# Patient Record
Sex: FEMALE | Employment: PART TIME | URBAN - METROPOLITAN AREA
[De-identification: names, ages, dates, MRNs, and addresses within clinical notes are randomized per-mention and may not be internally consistent; named-entity substitution may affect disease eponyms.]

---

## 2017-01-30 RX ORDER — LEVOTHYROXINE SODIUM 200 UG/1
TABLET ORAL
Qty: 90 TAB | Refills: 0 | Status: SHIPPED | OUTPATIENT
Start: 2017-01-30 | End: 2017-02-14 | Stop reason: DRUGHIGH

## 2017-02-10 ENCOUNTER — OFFICE VISIT (OUTPATIENT)
Dept: FAMILY MEDICINE CLINIC | Age: 47
End: 2017-02-10

## 2017-02-10 VITALS
SYSTOLIC BLOOD PRESSURE: 120 MMHG | BODY MASS INDEX: 35.84 KG/M2 | OXYGEN SATURATION: 98 % | RESPIRATION RATE: 14 BRPM | HEART RATE: 97 BPM | TEMPERATURE: 98.2 F | HEIGHT: 66 IN | WEIGHT: 223 LBS | DIASTOLIC BLOOD PRESSURE: 80 MMHG

## 2017-02-10 DIAGNOSIS — E03.4 HYPOTHYROIDISM DUE TO ACQUIRED ATROPHY OF THYROID: Primary | ICD-10-CM

## 2017-02-10 DIAGNOSIS — R53.82 CHRONIC FATIGUE: ICD-10-CM

## 2017-02-10 LAB
BILIRUB UR QL STRIP: NEGATIVE
GLUCOSE UR-MCNC: NEGATIVE MG/DL
KETONES P FAST UR STRIP-MCNC: NEGATIVE MG/DL
PH UR STRIP: 7 [PH] (ref 4.6–8)
PROT UR QL STRIP: NEGATIVE MG/DL
SP GR UR STRIP: 1.01 (ref 1–1.03)
UA UROBILINOGEN AMB POC: NORMAL (ref 0.2–1)
URINALYSIS CLARITY POC: CLEAR
URINALYSIS COLOR POC: YELLOW
URINE BLOOD POC: NEGATIVE
URINE LEUKOCYTES POC: NORMAL
URINE NITRITES POC: NEGATIVE

## 2017-02-10 NOTE — PROGRESS NOTES
Chief Complaint   Patient presents with    Dizziness     pt states \" can't turn head real quick, I'll get dizzy\"    Fatigue    Thyroid Problem    Arm Pain     left arm pain   Pain gets up to a \"4\"  \"pulsating pain\"     1. Have you been to the ER, urgent care clinic since your last visit? Hospitalized since your last visit? No    2. Have you seen or consulted any other health care providers outside of the [facility] since your last visit? Include any pap smears or colon screening.  No   Health Maintenance Due   Topic Date Due    PAP AKA CERVICAL CYTOLOGY  06/05/2016

## 2017-02-10 NOTE — MR AVS SNAPSHOT
Visit Information Date & Time Provider Department Dept. Phone Encounter #  
 2/10/2017 11:30 AM Wilma Baeza MD [facility] [phone] [id number] Upcoming Health Maintenance Date Due  
 PAP AKA CERVICAL CYTOLOGY 6/5/2016 DTaP/Tdap/Td series (2 - Td) 2/10/2027 Allergies as of 2/10/2017  Review Complete On: 2/10/2017 By: Wilma Baeza MD  
  
 Severity Noted Reaction Type Reactions Diflucan [Fluconazole]  10/19/2012    Angioedema Current Immunizations  Reviewed on 1/13/2014 No immunizations on file. Not reviewed this visit You Were Diagnosed With   
  
 Codes Comments Hypothyroidism due to acquired atrophy of thyroid    -  Primary ICD-10-CM: E03.4 ICD-9-CM: 244.8, 246.8 Chronic fatigue     ICD-10-CM: R53.82 
ICD-9-CM: 780.79 Vitals BP Pulse Temp Resp Height(growth percentile) Weight(growth percentile) 120/80 97 98.2 °F (36.8 °C) (Oral) 14 5' 5.5\" (1.664 m) 223 lb (101.2 kg) SpO2 BMI OB Status Smoking Status 98% 36.54 kg/m2 Having regular periods Never Smoker Vitals History BMI and BSA Data Body Mass Index Body Surface Area  
 36.54 kg/m 2 2.16 m 2 Preferred Pharmacy Pharmacy Name Phone [facility] [address] [place] [address] [address] [phone] Your Updated Medication List  
  
   
This list is accurate as of: 2/10/17  1:00 PM.  Always use your most recent med list.  
  
  
  
  
 CLARITIN 10 mg tablet Generic drug:  loratadine Take 10 mg by mouth daily. EPINEPHrine 0.3 mg/0.3 mL injection Commonly known as:  EPIPEN  
0.3 mL by IntraMUSCular route once as needed for 1 dose. furosemide 40 mg tablet Commonly known as:  LASIX TAKE 1 TABLET BY MOUTH EVERY DAY AS NEEDED FOR FLUID SYNTHROID 200 mcg tablet Generic drug:  levothyroxine TAKE 1 TABLET BY MOUTH EVERY DAY BEFORE BREAKFAST. DISCONTINUE SYNTHROID 300MCG PER MD  
  
  
  
  
We Performed the Following CBC WITH AUTOMATED DIFF [13548 CPT(R)] METABOLIC PANEL, COMPREHENSIVE [28383 CPT(R)]   
 T4,FREE(DIRECT) [id number] CPT(R)] TSH 3RD GENERATION [25209 CPT(R)] Introducing [facility] & [facility] SERVICES! Dear Jonathan Jha: 
Thank you for requesting a [employer] account. Our records indicate that you have previously registered for a [employer] account but its currently inactive. Please call our [employer] support line at [phone]. Additional Information If you have questions, please visit the Frequently Asked Questions section of the [employer] website at [email or web address] Remember, [employer] is NOT to be used for urgent needs. For medical emergencies, dial 911. Now available from your iPhone and Android! Please provide this summary of care documentation to your next provider. Your primary care clinician is listed as Armando Robles. If you have any questions after today's visit, please call [phone].
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
## 2017-02-11 LAB
ALBUMIN SERPL-MCNC: 4.6 G/DL (ref 3.5–5.5)
ALBUMIN/GLOB SERPL: 1.8 {RATIO} (ref 1.1–2.5)
ALP SERPL-CCNC: 113 IU/L (ref 39–117)
ALT SERPL-CCNC: 18 IU/L (ref 0–32)
AST SERPL-CCNC: 19 IU/L (ref 0–40)
BASOPHILS # BLD AUTO: 0 X10E3/UL (ref 0–0.2)
BASOPHILS NFR BLD AUTO: 1 %
BILIRUB SERPL-MCNC: 0.2 MG/DL (ref 0–1.2)
BUN SERPL-MCNC: 12 MG/DL (ref 6–24)
BUN/CREAT SERPL: 16 (ref 9–23)
CALCIUM SERPL-MCNC: 9.3 MG/DL (ref 8.7–10.2)
CHLORIDE SERPL-SCNC: 97 MMOL/L (ref 96–106)
CO2 SERPL-SCNC: 22 MMOL/L (ref 18–29)
CREAT SERPL-MCNC: 0.75 MG/DL (ref 0.57–1)
EOSINOPHIL # BLD AUTO: 0.1 X10E3/UL (ref 0–0.4)
EOSINOPHIL NFR BLD AUTO: 2 %
ERYTHROCYTE [DISTWIDTH] IN BLOOD BY AUTOMATED COUNT: 14.2 % (ref 12.3–15.4)
GLOBULIN SER CALC-MCNC: 2.6 G/DL (ref 1.5–4.5)
GLUCOSE SERPL-MCNC: 102 MG/DL (ref 65–99)
HCT VFR BLD AUTO: 45.9 % (ref 34–46.6)
HGB BLD-MCNC: 15.6 G/DL (ref 11.1–15.9)
IMM GRANULOCYTES # BLD: 0 X10E3/UL (ref 0–0.1)
IMM GRANULOCYTES NFR BLD: 0 %
LYMPHOCYTES # BLD AUTO: 2.7 X10E3/UL (ref 0.7–3.1)
LYMPHOCYTES NFR BLD AUTO: 42 %
MCH RBC QN AUTO: 29.4 PG (ref 26.6–33)
MCHC RBC AUTO-ENTMCNC: 34 G/DL (ref 31.5–35.7)
MCV RBC AUTO: 86 FL (ref 79–97)
MONOCYTES # BLD AUTO: 0.3 X10E3/UL (ref 0.1–0.9)
MONOCYTES NFR BLD AUTO: 5 %
NEUTROPHILS # BLD AUTO: 3.2 X10E3/UL (ref 1.4–7)
NEUTROPHILS NFR BLD AUTO: 50 %
PLATELET # BLD AUTO: 297 X10E3/UL (ref 150–379)
POTASSIUM SERPL-SCNC: 4 MMOL/L (ref 3.5–5.2)
PROT SERPL-MCNC: 7.2 G/DL (ref 6–8.5)
RBC # BLD AUTO: 5.31 X10E6/UL (ref 3.77–5.28)
SODIUM SERPL-SCNC: 140 MMOL/L (ref 134–144)
T4 FREE SERPL-MCNC: 2.46 NG/DL (ref 0.82–1.77)
TSH SERPL DL<=0.005 MIU/L-ACNC: <0.006 UIU/ML (ref 0.45–4.5)
WBC # BLD AUTO: 6.4 X10E3/UL (ref 3.4–10.8)

## 2017-03-27 ENCOUNTER — OFFICE VISIT (OUTPATIENT)
Dept: FAMILY MEDICINE CLINIC | Age: 47
End: 2017-03-27

## 2017-03-27 VITALS
BODY MASS INDEX: 36.16 KG/M2 | OXYGEN SATURATION: 96 % | HEIGHT: 66 IN | SYSTOLIC BLOOD PRESSURE: 110 MMHG | WEIGHT: 225 LBS | TEMPERATURE: 98.1 F | HEART RATE: 81 BPM | DIASTOLIC BLOOD PRESSURE: 69 MMHG | RESPIRATION RATE: 14 BRPM

## 2017-03-27 DIAGNOSIS — E03.4 HYPOTHYROIDISM DUE TO ACQUIRED ATROPHY OF THYROID: Primary | ICD-10-CM

## 2017-03-27 RX ORDER — LEVOTHYROXINE SODIUM 200 UG/1
TABLET ORAL
Refills: 0 | COMMUNITY
Start: 2017-01-31 | End: 2017-03-27 | Stop reason: DRUGHIGH

## 2017-03-27 NOTE — PROGRESS NOTES
HISTORY OF PRESENT ILLNESS  Zoraida Marin is a 40 y.o. female. HPI Comes in with daughter HANNAH. Needs thyroid rechecked. STill has some fatigue at times. Also has some insomnia at times. Also has irritability, hot flashes. No palpitations, sweating. Some hot flashes that last for 30-60 seconds. Has gained some weight. Followed for hot flashes by GYN. ROS    Physical Exam   Constitutional: She is oriented to person, place, and time. She appears well-developed and well-nourished. Neck: No thyromegaly present. Cardiovascular: Normal rate, regular rhythm and normal heart sounds. No murmur heard. Pulmonary/Chest: Effort normal and breath sounds normal. She has no wheezes. Abdominal: Soft. Bowel sounds are normal. She exhibits no distension. There is no tenderness. There is no rebound and no guarding. Musculoskeletal: Normal range of motion. She exhibits no edema. Lymphadenopathy:     She has no cervical adenopathy. Neurological: She is alert and oriented to person, place, and time. Nursing note and vitals reviewed. ASSESSMENT and PLAN  Orders Placed This Encounter    T4,FREE(DIRECT)    TSH 3RD GENERATION     Daren Vegas was seen today for dizziness and thyroid problem. Diagnoses and all orders for this visit:    Hypothyroidism due to acquired atrophy of thyroid  -     T4,FREE(DIRECT)  -     TSH 3RD GENERATION      Follow-up Disposition:  Return in about 4 months (around 7/27/2017).

## 2017-03-27 NOTE — MR AVS SNAPSHOT
Visit Information Date & Time Provider Department Dept. Phone Encounter #  
 3/27/2017  4:15 PM Cristofer Rodriguez MD [facility] [phone] [id number] Upcoming Health Maintenance Date Due  
 PAP AKA CERVICAL CYTOLOGY 6/5/2016 DTaP/Tdap/Td series (2 - Td) 2/10/2027 Allergies as of 3/27/2017  Review Complete On: 3/27/2017 By: Dominic Cee LPN Severity Noted Reaction Type Reactions Diflucan [Fluconazole]  10/19/2012    Angioedema Current Immunizations  Reviewed on 1/13/2014 No immunizations on file. Not reviewed this visit You Were Diagnosed With   
  
 Codes Comments Hypothyroidism due to acquired atrophy of thyroid    -  Primary ICD-10-CM: E03.4 ICD-9-CM: 244.8, 246.8 Vitals BP Pulse Temp Resp Height(growth percentile) Weight(growth percentile) 110/69 81 98.1 °F (36.7 °C) (Oral) 14 5' 5.5\" (1.664 m) 225 lb (102.1 kg) SpO2 BMI OB Status Smoking Status 96% 36.87 kg/m2 Having regular periods Never Smoker BMI and BSA Data Body Mass Index Body Surface Area  
 36.87 kg/m 2 2.17 m 2 Preferred Pharmacy Pharmacy Name Phone [facility][id number]- [place], [place] - [address] [phone] Your Updated Medication List  
  
   
This list is accurate as of: 3/27/17  5:04 PM.  Always use your most recent med list.  
  
  
  
  
 CLARITIN 10 mg tablet Generic drug:  loratadine Take 10 mg by mouth daily. EPINEPHrine 0.3 mg/0.3 mL injection Commonly known as:  EPIPEN  
0.3 mL by IntraMUSCular route once as needed for 1 dose. furosemide 40 mg tablet Commonly known as:  LASIX TAKE 1 TABLET BY MOUTH EVERY DAY AS NEEDED FOR FLUID  
  
 levothyroxine 150 mcg tablet Commonly known as:  SYNTHROID Take 1 Tab by mouth Daily (before breakfast). We Performed the Following T4,FREE(DIRECT) [id number] CPT(R)] TSH 3RD GENERATION [56100 CPT(R)] Introducing [facility] & [facility] SERVICES! Dear Rhina Smith: 
Thank you for requesting a [employer] account. Our records indicate that you have previously registered for a [employer] account but its currently inactive. Please call our [employer] support line at [phone]. Additional Information If you have questions, please visit the Frequently Asked Questions section of the [employer] website at [email or web address] Remember, [employer] is NOT to be used for urgent needs. For medical emergencies, dial 911. Now available from your iPhone and Android! Please provide this summary of care documentation to your next provider. Your primary care clinician is listed as Aminah Kwok. If you have any questions after today's visit, please call [phone].

## 2017-03-27 NOTE — PROGRESS NOTES
Chief Complaint   Patient presents with    Dizziness     f/u    Thyroid Problem     f/u     1. Have you been to the ER, urgent care clinic since your last visit? Hospitalized since your last visit? No    2. Have you seen or consulted any other health care providers outside of the [address] [place] [address] since your last visit? Include any pap smears or colon screening.  No     Health Maintenance Due   Topic Date Due    PAP AKA CERVICAL CYTOLOGY  06/05/2016

## 2017-03-28 LAB
T4 FREE SERPL-MCNC: 2.43 NG/DL (ref 0.82–1.77)
TSH SERPL DL<=0.005 MIU/L-ACNC: <0.006 UIU/ML (ref 0.45–4.5)

## 2017-05-04 RX ORDER — FUROSEMIDE 40 MG/1
TABLET ORAL
Qty: 30 TAB | Refills: 0 | Status: SHIPPED | OUTPATIENT
Start: 2017-05-04 | End: 2017-06-01 | Stop reason: SDUPTHER

## 2017-06-01 RX ORDER — FUROSEMIDE 40 MG/1
TABLET ORAL
Qty: 30 TAB | Refills: 0 | Status: SHIPPED | OUTPATIENT
Start: 2017-06-01 | End: 2017-06-29 | Stop reason: SDUPTHER

## 2017-06-29 RX ORDER — FUROSEMIDE 40 MG/1
TABLET ORAL
Qty: 30 TAB | Refills: 0 | Status: SHIPPED | OUTPATIENT
Start: 2017-06-29 | End: 2017-07-24 | Stop reason: SDUPTHER

## 2017-07-24 ENCOUNTER — OFFICE VISIT (OUTPATIENT)
Dept: FAMILY MEDICINE CLINIC | Age: 47
End: 2017-07-24

## 2017-07-24 VITALS
BODY MASS INDEX: 38.31 KG/M2 | WEIGHT: 238.4 LBS | DIASTOLIC BLOOD PRESSURE: 71 MMHG | SYSTOLIC BLOOD PRESSURE: 115 MMHG | HEART RATE: 84 BPM | RESPIRATION RATE: 18 BRPM | HEIGHT: 66 IN | TEMPERATURE: 96.9 F | OXYGEN SATURATION: 96 %

## 2017-07-24 DIAGNOSIS — E03.9 ACQUIRED HYPOTHYROIDISM: Primary | ICD-10-CM

## 2017-07-24 RX ORDER — FUROSEMIDE 40 MG/1
40 TABLET ORAL DAILY
Qty: 90 TAB | Status: SHIPPED | OUTPATIENT
Start: 2017-07-24 | End: 2018-10-30 | Stop reason: SDUPTHER

## 2017-07-24 NOTE — PROGRESS NOTES
HISTORY OF PRESENT ILLNESS  Jo-Ann Ramirez is a 56 y.o. female. HPI In for thyroid check. Has gone thru the menopause. Gets an occasional hot flash. Otherwise doing fairly well. Still doing hair. ROS    Physical Exam   Constitutional: She is oriented to person, place, and time. She appears well-developed and well-nourished. Neck: No thyromegaly present. Cardiovascular: Normal rate, regular rhythm and normal heart sounds. No murmur heard. Pulmonary/Chest: Effort normal and breath sounds normal. She has no wheezes. Abdominal: Soft. Bowel sounds are normal. She exhibits no distension. There is no tenderness. There is no rebound and no guarding. Musculoskeletal: Normal range of motion. She exhibits no edema. Lymphadenopathy:     She has no cervical adenopathy. Neurological: She is alert and oriented to person, place, and time. Nursing note and vitals reviewed. ASSESSMENT and PLAN  Orders Placed This Encounter    TSH 3RD GENERATION    T4,FREE(DIRECT)    furosemide (LASIX) 40 mg tablet     Ruby was seen today for thyroid problem. Diagnoses and all orders for this visit:    Acquired hypothyroidism  -     TSH 3RD GENERATION  -     T4,FREE(DIRECT)    Other orders  -     furosemide (LASIX) 40 mg tablet; Take 1 Tab by mouth daily.       Follow-up Disposition: Not on File

## 2017-07-24 NOTE — MR AVS SNAPSHOT
Visit Information Date & Time Provider Department Dept. Phone Encounter #  
 7/24/2017 10:00 AM Rae Joel MD [facility] [phone] [id number] Upcoming Health Maintenance Date Due  
 PAP AKA CERVICAL CYTOLOGY 6/5/2016 INFLUENZA AGE 9 TO ADULT 8/1/2017 DTaP/Tdap/Td series (2 - Td) 2/10/2027 Allergies as of 7/24/2017  Review Complete On: 7/24/2017 By: Onel Montaño LPN Severity Noted Reaction Type Reactions Diflucan [Fluconazole]  10/19/2012    Angioedema Current Immunizations  Reviewed on 1/13/2014 No immunizations on file. Not reviewed this visit You Were Diagnosed With   
  
 Codes Comments Acquired hypothyroidism    -  Primary ICD-10-CM: E03.9 ICD-9-CM: 663. 9 Vitals BP Pulse Temp Resp Height(growth percentile) Weight(growth percentile) 115/71 (BP 1 Location: Right arm, BP Patient Position: Sitting) 84 96.9 °F (36.1 °C) (Oral) 18 5' 5.5\" (1.664 m) 238 lb 6.4 oz (108.1 kg) LMP SpO2 BMI OB Status Smoking Status 05/14/2014 (Approximate) 96% 39.07 kg/m2 Postmenopausal Never Smoker Vitals History BMI and BSA Data Body Mass Index Body Surface Area 39.07 kg/m 2 2.24 m 2 Preferred Pharmacy Pharmacy Name Phone [id number] [address] [phone] Your Updated Medication List  
  
   
This list is accurate as of: 7/24/17 11:06 AM.  Always use your most recent med list.  
  
  
  
  
 CLARITIN 10 mg tablet Generic drug:  loratadine Take 10 mg by mouth daily. EPINEPHrine 0.3 mg/0.3 mL injection Commonly known as:  EPIPEN  
0.3 mL by IntraMUSCular route once as needed for 1 dose. furosemide 40 mg tablet Commonly known as:  LASIX Take 1 Tab by mouth daily. levothyroxine 125 mcg tablet Commonly known as:  SYNTHROID Take 1 Tab by mouth Daily (before breakfast). Prescriptions Sent to Pharmacy Refills  
 furosemide (LASIX) 40 mg tablet PRN Sig: Take 1 Tab by mouth daily. Class: Normal  
 Pharmacy: [facility]/pharmacy [address] [facility] [address]  #: [phone] Route: Oral  
  
We Performed the Following T4,FREE(DIRECT) [id number] CPT(R)] TSH 3RD GENERATION [65156 CPT(R)] Introducing [facility] & [facility] SERVICES! Dear Gabi Vargas: 
Thank you for requesting a [employer] account. Our records indicate that you have previously registered for a [employer] account but its currently inactive. Please call our [employer] support line at [phone]. Additional Information If you have questions, please visit the Frequently Asked Questions section of the [employer] website at [email or web address] Remember, [employer] is NOT to be used for urgent needs. For medical emergencies, dial 911. Now available from your iPhone and Android! Please provide this summary of care documentation to your next provider. Your primary care clinician is listed as Jorge Eubanks. If you have any questions after today's visit, please call [phone].

## 2017-07-24 NOTE — PROGRESS NOTES
Chief Complaint   Patient presents with    Thyroid Problem     follow up     Still complaining of being tired. C/O of left leg pain after being on it for awhile. GYN by Dr. Rita Monique, due this year.

## 2017-07-25 LAB
T4 FREE SERPL-MCNC: 1.35 NG/DL (ref 0.82–1.77)
TSH SERPL DL<=0.005 MIU/L-ACNC: 2.68 UIU/ML (ref 0.45–4.5)

## 2017-08-25 RX ORDER — FUROSEMIDE 40 MG/1
TABLET ORAL
Qty: 30 TAB | Refills: 0 | Status: SHIPPED | OUTPATIENT
Start: 2017-08-25 | End: 2020-01-30 | Stop reason: SDUPTHER

## 2017-08-29 RX ORDER — LEVOTHYROXINE SODIUM 125 UG/1
125 TABLET ORAL
Qty: 90 TAB | Refills: 2 | Status: SHIPPED | OUTPATIENT
Start: 2017-08-29 | End: 2017-09-01 | Stop reason: SDUPTHER

## 2017-09-02 RX ORDER — LEVOTHYROXINE SODIUM 125 UG/1
125 TABLET ORAL
Qty: 90 TAB | Refills: 2 | Status: SHIPPED | OUTPATIENT
Start: 2017-09-02 | End: 2021-06-29 | Stop reason: ALTCHOICE

## 2018-10-30 RX ORDER — FUROSEMIDE 40 MG/1
40 TABLET ORAL DAILY
Qty: 90 TAB | Refills: 0 | Status: SHIPPED | OUTPATIENT
Start: 2018-10-30 | End: 2021-03-29 | Stop reason: SDUPTHER

## 2018-10-30 NOTE — TELEPHONE ENCOUNTER
Last Visit: 7/24/17  Next Appt: none  Previous Refill Encounter: 7/24/17-90+998    Requested Prescriptions     Pending Prescriptions Disp Refills    furosemide (LASIX) 40 mg tablet 90 Tab 0     Sig: Take 1 Tab by mouth daily.  (needs appt for further refills)

## 2020-01-30 ENCOUNTER — OFFICE VISIT (OUTPATIENT)
Dept: INTERNAL MEDICINE CLINIC | Age: 50
End: 2020-01-30

## 2020-01-30 VITALS
RESPIRATION RATE: 16 BRPM | BODY MASS INDEX: 38.41 KG/M2 | OXYGEN SATURATION: 95 % | WEIGHT: 239 LBS | HEIGHT: 66 IN | SYSTOLIC BLOOD PRESSURE: 127 MMHG | HEART RATE: 81 BPM | DIASTOLIC BLOOD PRESSURE: 82 MMHG

## 2020-01-30 DIAGNOSIS — E66.01 SEVERE OBESITY (HCC): ICD-10-CM

## 2020-01-30 DIAGNOSIS — R41.3 MEMORY DEFICIT: ICD-10-CM

## 2020-01-30 DIAGNOSIS — M62.838 TRAPEZIUS MUSCLE SPASM: ICD-10-CM

## 2020-01-30 DIAGNOSIS — M54.2 NECK PAIN: Primary | ICD-10-CM

## 2020-01-30 DIAGNOSIS — F90.9 ATTENTION DEFICIT HYPERACTIVITY DISORDER (ADHD), UNSPECIFIED ADHD TYPE: ICD-10-CM

## 2020-01-30 RX ORDER — MELOXICAM 15 MG/1
TABLET ORAL
COMMUNITY
Start: 2019-12-04 | End: 2021-08-17
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
## 2020-01-31 ENCOUNTER — TELEPHONE (OUTPATIENT)
Dept: NEUROLOGY | Age: 50
End: 2020-01-31

## 2020-01-31 NOTE — TELEPHONE ENCOUNTER
----- Message from Patsy Gordillo sent at 1/31/2020  9:01 AM EST -----  Regarding: Dr. Marly Taylor  Pt would like a call back regarding scheduling a NP appt.  Contact is [phone]

## 2020-02-04 NOTE — TELEPHONE ENCOUNTER
----- Message from Tian [place] sent at 2/4/2020 10:16 AM EST -----  Regarding: YOLANDA/MD/TELEPHONE  Contact: [phone]  Caller's first and last name: Nicole Brown  Reason for call: N/A  Callback required yes/no and why: Yes  Best contact number(s): [phone]  Details to clarify the request: New patient requesting to schedule a new pt appt with Dr. Lisette Norman. 2nd attempt.

## 2020-02-11 ENCOUNTER — APPOINTMENT (OUTPATIENT)
Dept: PHYSICAL THERAPY | Age: 50
End: 2020-02-11

## 2020-02-19 ENCOUNTER — OFFICE VISIT (OUTPATIENT)
Dept: NEUROLOGY | Age: 50
End: 2020-02-19

## 2020-02-19 DIAGNOSIS — F90.9 ATTENTION DEFICIT HYPERACTIVITY DISORDER (ADHD), UNSPECIFIED ADHD TYPE: Primary | ICD-10-CM

## 2020-02-19 DIAGNOSIS — F33.1 MODERATE EPISODE OF RECURRENT MAJOR DEPRESSIVE DISORDER (HCC): ICD-10-CM

## 2020-02-19 DIAGNOSIS — F31.0 BIPOLAR AFFECTIVE DISORDER, CURRENT EPISODE HYPOMANIC (HCC): ICD-10-CM
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
## 2020-02-21 ENCOUNTER — HOSPITAL ENCOUNTER (OUTPATIENT)
Dept: PHYSICAL THERAPY | Age: 50
Discharge: HOME | End: 2020-02-21
Payer: MEDICAID

## 2020-02-21 PROCEDURE — 97161 PT EVAL LOW COMPLEX 20 MIN: CPT
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
## 2020-02-24 ENCOUNTER — APPOINTMENT (OUTPATIENT)
Dept: PHYSICAL THERAPY | Age: 50
End: 2020-02-24
Payer: MEDICAID

## 2020-02-28 ENCOUNTER — HOSPITAL ENCOUNTER (OUTPATIENT)
Dept: PHYSICAL THERAPY | Age: 50
Discharge: HOME | End: 2020-02-28
Payer: MEDICAID

## 2020-02-28 PROCEDURE — 97014 ELECTRIC STIMULATION THERAPY: CPT

## 2020-02-28 PROCEDURE — 97110 THERAPEUTIC EXERCISES: CPT

## 2020-02-28 PROCEDURE — 97140 MANUAL THERAPY 1/> REGIONS: CPT

## 2020-02-28 NOTE — PROGRESS NOTES
PT DAILY TREATMENT NOTE - [facility] 2-15    Patient Name: Han Ramires  Date:2020  : 1970  [x]  Patient  Verified  Payor: [place] / Plan: 1500 / Product Type: Medicaid /    In time:7:35 am  Out time:8:36 am  Total Treatment Time (min): 61  Total Timed Codes (min): 46  1:1 Treatment Time ( only): --   Visit #:  2    Treatment Area: Neck pain [M54.2]  Low back pain [M54.5]    SUBJECTIVE  Pain Level (0-10 scale): 5/10  Any medication changes, allergies to medications, adverse drug reactions, diagnosis change, or new procedure performed?: [x] No    [] Yes (see summary sheet for update)  Subjective functional status/changes:   [] No changes reported  Pt reports she has been working on her cervical retractions diligently which have felt good. Pt notes continued daily headaches, though decreased length of time. She did not have a headache the rest of the day or the day after evaluation. OBJECTIVE    Modality rationale: decrease pain and increase tissue extensibility to improve the patients ability to perform functional activities with decreased pain or discomfort.    Min Type Additional Details      15 [x] Estim: []Att   []Unatt    []TENS instruct                  []IFC  []Premod   []NMES                     []Other:  []w/US   []w/ice   []w/heat  Position:  Location:       []  Traction: [] Cervical       []Lumbar                       [] Prone          []Supine                       []Intermittent   []Continuous Lbs:  [] before manual  [] after manual  []w/heat    []  Ultrasound: []Continuous   [] Pulsed                       at: []1MHz   []3MHz Location:  W/cm2:    [] Paraffin         Location:   []w/heat    []  Ice     []  Heat  []  Ice massage Position:  Location:    []  Laser  []  Other: Position:  Location:      []  Vasopneumatic Device Pressure:       [] lo [] med [] hi   Temperature:      [x] Skin assessment post-treatment:  [x]intact []redness- no adverse reaction []redness - adverse reaction:     31 min Therapeutic Exercise:  [] See flow sheet :   Rationale: increase ROM and increase strength to improve the patients ability to perform ADLs with decreased pain or discomfort. 15 min Manual Therapy: SOR, MFR of cervical PS and upper trap musculature bilaterally in supine   Rationale: decrease pain, increase ROM, increase tissue extensibility and decrease trigger points to improve the patients ability to perform functional activities with decreased pain or discomfort. With   [] TE   [] TA   [] neuro   [] other: Patient Education: [x] Review HEP    [] Progressed/Changed HEP based on:   [] positioning   [] body mechanics   [] transfers   [] heat/ice application    [] other:      Other Objective/Functional Measures: --     Pain Level (0-10 scale) post treatment: 3/10    ASSESSMENT/Changes in Function:   Pt responded well to manual treatment today, noting tenderness within suboccipital musculature, though tolerable and decreased tension afterwards. Added various deep cervical and periscapular strengthening without complaints of increased neck or upper back pain. Introduced ab bracing against the wall for bilateral shoulder flexion with cuing to maintain contact of back against wall throughout motion, minimizing compensations. Patient will continue to benefit from skilled PT services to modify and progress therapeutic interventions, address functional mobility deficits, address ROM deficits, address strength deficits, analyze and address soft tissue restrictions, analyze and cue movement patterns, analyze and modify body mechanics/ergonomics and assess and modify postural abnormalities to attain remaining goals. [x]  See Plan of Care  []  See progress note/recertification  []  See Discharge Summary         Progress towards goals / Updated goals:  Short Term Goals: To be accomplished in 8 treatments:               1. Pt will be independent and compliant with HEP. 2. Pt will report and demonstrate improved sitting/standing posture. Long Term Goals: To be accomplished in 16 treatments:               1. Pt will be independent and compliant with HEP. 2. Pt will improve FOTO score by the MCID from 43 to 56 demonstrating improved overall function with decreased pain or discomfort. 3. Pt will report </= 2 headaches per week. 4. Pt will be able to tolerate work shifts without pain >3/10.               5. Pt will demonstrate >/= 60 degrees cervical rotation bilaterally without complaints of increased pain. 6. Pt will be able to drive without complaints of neck pain.     PLAN  [x]  Upgrade activities as tolerated     [x]  Continue plan of care  []  Update interventions per flow sheet       []  Discharge due to:_  []  Other:_      Carolann Schultz 2/28/2020

## 2020-03-04 ENCOUNTER — HOSPITAL ENCOUNTER (OUTPATIENT)
Dept: PHYSICAL THERAPY | Age: 50
Discharge: HOME | End: 2020-03-04
Payer: MEDICAID

## 2020-03-04 PROCEDURE — 97014 ELECTRIC STIMULATION THERAPY: CPT

## 2020-03-04 PROCEDURE — 97110 THERAPEUTIC EXERCISES: CPT

## 2020-03-04 PROCEDURE — 97140 MANUAL THERAPY 1/> REGIONS: CPT

## 2020-03-04 NOTE — PROGRESS NOTES
PT DAILY TREATMENT NOTE - [facility] 2-15    Patient Name: Golden Lay  Date:3/4/2020  : 1970  [x]  Patient  Verified  Payor: [place] / Plan: 1500  / Product Type: Medicaid /    In time: 2:02P  Out time: 3:20P  Total Treatment Time (min): 78  Total Timed Codes (min): 63  1:1 Treatment Time ( only): --   Visit #:  3    Treatment Area: Neck pain [M54.2]  Low back pain [M54.5]    SUBJECTIVE  Pain Level (0-10 scale): 5/10  Any medication changes, allergies to medications, adverse drug reactions, diagnosis change, or new procedure performed?: [x]? No    []? Yes (see summary sheet for update)  Subjective functional status/changes:   []? No changes reported  Pt reported feeling much better after last session.     OBJECTIVE            Modality rationale: decrease pain and increase tissue extensibility to improve the patients ability to perform functional activities with decreased pain or discomfort. Min Type Additional Details      15 [x]? Estim: []? Att   []? Unatt    []? TENS instruct                  []?IFC  []? Premod   []? NMES                     []?Other:  []?w/US   []?w/ice   []?w/heat  Position:  Location:        []? Traction: []? Cervical       []? Lumbar                       []? Prone          []? Supine                       []?Intermittent   []? Continuous Lbs:  []? before manual  []? after manual  []?w/heat     []? Ultrasound: []? Continuous   []? Pulsed                       at: []?1MHz   []? 3MHz Location:  W/cm2:     []? Paraffin         Location:   []?w/heat     []? Ice     []? Heat  []? Ice massage Position:  Location:     []? Laser  []? Other: Position:  Location:        []? Vasopneumatic Device Pressure:       []? lo []? med []? hi   Temperature:       [x]? Skin assessment post-treatment:  [x]? intact []? redness- no adverse reaction    []? redness - adverse reaction:      48 min Therapeutic Exercise:  [x]?  See flow sheet :   Rationale: increase ROM and increase strength to improve the patients ability to perform ADLs with decreased pain or discomfort.     15 min Manual Therapy: SOR, MFR of cervical PS and upper trap musculature bilaterally in supine   Rationale: decrease pain, increase ROM, increase tissue extensibility and decrease trigger points to improve the patients ability to perform functional activities with decreased pain or discomfort. With   []? TE   []? TA   []? neuro   []? other: Patient Education: [x]? Review HEP    []? Progressed/Changed HEP based on:   []? positioning   []? body mechanics   []? transfers   []? heat/ice application    []? other:       Other Objective/Functional Measures: --        Pain Level (0-10 scale) post treatment: 3/10     ASSESSMENT/Changes in Function:   Pt tolerated added strengthening and stabilization well today. Does require cues on core stabilization duringstanding scapular strengthening exercises. Patient will continue to benefit from skilled PT services to modify and progress therapeutic interventions, address functional mobility deficits, address ROM deficits, address strength deficits, analyze and address soft tissue restrictions, analyze and cue movement patterns, analyze and modify body mechanics/ergonomics and assess and modify postural abnormalities to attain remaining goals. [x]? See Plan of Care  []? See progress note/recertification  []? See Discharge Summary         Progress towards goals / Updated goals:  Short Term Goals: To be accomplished in 8 treatments:               1. Pt will be independent and compliant with HEP.              2. Pt will report and demonstrate improved sitting/standing posture. Long Term Goals: To be accomplished in 16 treatments:               1. Pt will be independent and compliant with HEP.                2. Pt will improve FOTO score by the MCID from 43 to 56 demonstrating improved overall function with decreased pain or discomfort.                3. Pt will report </= 2 headaches per week.               4. Pt will be able to tolerate work shifts without pain >3/10.               5. Pt will demonstrate >/= 60 degrees cervical rotation bilaterally without complaints of increased pain.               6. Pt will be able to drive without complaints of neck pain.     PLAN  [x]  Upgrade activities as tolerated     [x]  Continue plan of care  []  Update interventions per flow sheet       []  Discharge due to:_  []  Other:_      Zackary Gayle PTA, OPTA 3/4/2020

## 2020-03-06 ENCOUNTER — HOSPITAL ENCOUNTER (OUTPATIENT)
Dept: PHYSICAL THERAPY | Age: 50
Discharge: HOME | End: 2020-03-06
Payer: MEDICAID

## 2020-03-06 PROCEDURE — 97140 MANUAL THERAPY 1/> REGIONS: CPT

## 2020-03-06 PROCEDURE — 97110 THERAPEUTIC EXERCISES: CPT

## 2020-03-06 PROCEDURE — 97014 ELECTRIC STIMULATION THERAPY: CPT

## 2020-03-06 NOTE — PROGRESS NOTES
PT DAILY TREATMENT NOTE - [facility] 2-15    Patient Name: Golden Lay  Date:3/6/2020  : 1970  [x]  Patient  Verified  Payor: [place] / Plan: 1500 / Product Type: Medicaid /    In time: 10:35 am  Out time: 11:48am  Total Treatment Time (min): 73  Total Timed Codes (min): 58  1:1 Treatment Time ( only): --   Visit #:  4    Treatment Area: Neck pain [M54.2]  Low back pain [M54.5]    SUBJECTIVE  Pain Level (0-10 scale): 3/10  Any medication changes, allergies to medications, adverse drug reactions, diagnosis change, or new procedure performed?: [x]? No    []? Yes (see summary sheet for update)  Subjective functional status/changes:   []? No changes reported  Pt reports the exercises have been helping quite a bit and she did them this morning which has eased her pain. Her headaches are down to 1x every 2-3 days. Levator scap stretch and cervical retractions have been most helpful.     OBJECTIVE            Modality rationale: decrease pain and increase tissue extensibility to improve the patients ability to perform functional activities with decreased pain or discomfort. Min Type Additional Details      15 [x]? Estim: []? Att   []? Unatt    []? TENS instruct                  []?IFC  []? Premod   []? NMES                     []?Other:  []?w/US   []?w/ice   []?w/heat  Position:  Location:        []? Traction: []? Cervical       []? Lumbar                       []? Prone          []? Supine                       []?Intermittent   []? Continuous Lbs:  []? before manual  []? after manual  []?w/heat     []? Ultrasound: []? Continuous   []? Pulsed                       at: []?1MHz   []? 3MHz Location:  W/cm2:     []? Paraffin         Location:   []?w/heat     []? Ice     []? Heat  []? Ice massage Position:  Location:     []? Laser  []? Other: Position:  Location:        []? Vasopneumatic Device Pressure:       []? lo []? med []? hi   Temperature:       [x]?  Skin assessment post-treatment:  [x]? intact []? redness- no adverse reaction    []? redness - adverse reaction:      44 min Therapeutic Exercise:  [x]? See flow sheet :   Rationale: increase ROM and increase strength to improve the patients ability to perform ADLs with decreased pain or discomfort.     14 min Manual Therapy: SOR, MFR of cervical PS and upper trap musculature bilaterally in supine   Rationale: decrease pain, increase ROM, increase tissue extensibility and decrease trigger points to improve the patients ability to perform functional activities with decreased pain or discomfort. With   []? TE   []? TA   []? neuro   []? other: Patient Education: [x]? Review HEP    []? Progressed/Changed HEP based on:   []? positioning   []? body mechanics   []? transfers   []? heat/ice application    []? other:       Other Objective/Functional Measures: --        Pain Level (0-10 scale) post treatment: 3/10     ASSESSMENT/Changes in Function:   Responded well to manual treatment. Pt demonstrates continued limited and painful shoulder abduction AROM; provided shoulder abduction isometrics to promote improved activation/proprioception for increased AROM with decreased pain. Patient will continue to benefit from skilled PT services to modify and progress therapeutic interventions, address functional mobility deficits, address ROM deficits, address strength deficits, analyze and address soft tissue restrictions, analyze and cue movement patterns, analyze and modify body mechanics/ergonomics and assess and modify postural abnormalities to attain remaining goals. [x]? See Plan of Care  []? See progress note/recertification  []? See Discharge Summary         Progress towards goals / Updated goals:  Short Term Goals: To be accomplished in 8 treatments:               1. Pt will be independent and compliant with HEP.                2. Pt will report and demonstrate improved sitting/standing posture. Long Term Goals: To be accomplished in 16 treatments:               1. Pt will be independent and compliant with HEP.              2. Pt will improve FOTO score by the MCID from 43 to 56 demonstrating improved overall function with decreased pain or discomfort.                3. Pt will report </= 2 headaches per week.               4. Pt will be able to tolerate work shifts without pain >3/10.               5. Pt will demonstrate >/= 60 degrees cervical rotation bilaterally without complaints of increased pain.               6. Pt will be able to drive without complaints of neck pain.     PLAN  [x]  Upgrade activities as tolerated     [x]  Continue plan of care  []  Update interventions per flow sheet       []  Discharge due to:_  []  Other:_      Kamron Hall, PT, DPT 3/6/2020

## 2020-03-11 ENCOUNTER — APPOINTMENT (OUTPATIENT)
Dept: PHYSICAL THERAPY | Age: 50
End: 2020-03-11
Payer: MEDICAID

## 2020-03-12 ENCOUNTER — OFFICE VISIT (OUTPATIENT)
Dept: INTERNAL MEDICINE CLINIC | Age: 50
End: 2020-03-12

## 2020-03-12 VITALS
BODY MASS INDEX: 37.93 KG/M2 | HEIGHT: 66 IN | HEART RATE: 90 BPM | WEIGHT: 236 LBS | SYSTOLIC BLOOD PRESSURE: 124 MMHG | RESPIRATION RATE: 16 BRPM | TEMPERATURE: 98.5 F | OXYGEN SATURATION: 95 % | DIASTOLIC BLOOD PRESSURE: 82 MMHG

## 2020-03-12 DIAGNOSIS — Z12.4 CERVICAL CANCER SCREENING: ICD-10-CM

## 2020-03-12 DIAGNOSIS — E03.9 HYPOTHYROIDISM, UNSPECIFIED TYPE: ICD-10-CM

## 2020-03-12 DIAGNOSIS — M62.838 TRAPEZIUS MUSCLE SPASM: ICD-10-CM

## 2020-03-12 DIAGNOSIS — Z01.419 WELL WOMAN EXAM: Primary | ICD-10-CM

## 2020-03-12 DIAGNOSIS — Z12.39 BREAST CANCER SCREENING: ICD-10-CM

## 2020-03-12 DIAGNOSIS — E55.9 HYPOVITAMINOSIS D: ICD-10-CM

## 2020-03-12 NOTE — PROGRESS NOTES
Chief Complaint   Patient presents with    Neck Pain    Attention Deficit Disorder     she is a 51y.o. year old female who presents for follow up of injury. Follow Up Pain Assessment Encounter      Onset of Symptoms:  A couple months  ________________________________________________________________________  Description: Pain is now better       Pain Scale:(1-10): 4  Duration:  intermittent  Radiation: none  What makes it better?: OTC meds and rest  What makes it worse?: certain movements  Medications tried: acetaminophen, ibuprofen  Modalities tried: pt     Other: neuropsych follow up     Reviewed and agree with Nurse Note and duplicated in this note. Reviewed PmHx, RxHx, FmHx, SocHx, AllgHx and updated and dated in the chart.     Family History   Problem Relation Age of Onset    Heart Disease Maternal Grandmother     Heart Disease Maternal Grandfather     Stroke Maternal Grandfather     Cancer Paternal Grandfather        Past Medical History:   Diagnosis Date    Hypothyroidism     Musculoskeletal disorder 6/2011    numbness/pain to left arm    Thyroid disease       Social History     Socioeconomic History    Marital status: SINGLE     Spouse name: Not on file    Number of children: Not on file    Years of education: Not on file    Highest education level: Not on file   Tobacco Use    Smoking status: Never Smoker    Smokeless tobacco: Never Used   Substance and Sexual Activity    Alcohol use: Yes     Comment: occ    Drug use: No    Sexual activity: Not Currently   Social History Narrative    Single, 3 children, 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        Review of Systems - negative except as listed above      Objective:     Vitals:    03/12/20 1418   BP: 124/82   Pulse: 90   Resp: 16   Temp: 98.5 °F (36.9 °C)   TempSrc: Oral   SpO2: 95%   Weight: 236 lb (107 kg)   Height: 5' 5.5\" (1.664 m)       Physical Examination: General appearance - alert, well appearing, and in no distress  Eyes - pupils equal and reactive, extraocular eye movements intact  Ears - bilateral TM's and external ear canals normal  Nose - normal and patent, no erythema, discharge or polyps  Mouth - mucous membranes moist, pharynx normal without lesions  Neck - supple, no significant adenopathy  Chest - clear to auscultation, no wheezes, rales or rhonchi, symmetric air entry  Heart - normal rate, regular rhythm, normal S1, S2, no murmurs, rubs, clicks or gallops  Abdomen - soft, nontender, nondistended, no masses or organomegaly  Neurological - alert, oriented, normal speech, no focal findings or movement disorder noted  Musculoskeletal - no joint tenderness, deformity or swelling  Extremities - peripheral pulses normal, no pedal edema, no clubbing or cyanosis  Skin - normal coloration and turgor, no rashes, no suspicious skin lesions noted    Assessment/ Plan:   Diagnoses and all orders for this visit:    1. Well woman exam  -     LIPID PANEL  -     CBC W/O DIFF  -     METABOLIC PANEL, COMPREHENSIVE  -     TSH 3RD GENERATION  -     [facility] MAMMO BI SCREENING INCL CAD; Future    2. Trapezius muscle spasm    3. Hypothyroidism, unspecified type  -     TSH 3RD GENERATION    4. Hypovitaminosis D  -     VITAMIN D, 25 HYDROXY    5. Breast cancer screening  -     [facility] MAMMO BI SCREENING INCL CAD; Future    6. Cervical cancer screening  -     REFERRAL TO OBSTETRICS AND GYNECOLOGY           Pathophysiology, recovery and rehabilitation process discussed and questions answered   Counseling for 30 Minutes of the total visit duration   Pictures and figures used as necessary   Provided reassurance   Monitor response to Physical Therapy   Recommend activity modification   Recommend  lower impact activities-walking, Eliptical, Nordic Track, cycling or swimming   Follow up in 4 week(s)  Xray's reviewed - within normal limits        I have discussed the diagnosis with the patient and the intended plan as seen in the above orders.   The patient has received an after-visit summary and questions were answered concerning future plans. Medication Side Effects and Warnings were discussed with patient,  Patient Labs were reviewed and or requested, and  Patient Past Records were reviewed and or requested  yes     Pt agrees to call or return to clinic and/or go to closest ER with any worsening of symptoms. This may include, but not limited to increased fever (>100.4) with NSAIDS or Tylenol, increased edema, confusion, rash, worsening of presenting symptoms.

## 2020-03-13 ENCOUNTER — APPOINTMENT (OUTPATIENT)
Dept: PHYSICAL THERAPY | Age: 50
End: 2020-03-13
Payer: MEDICAID

## 2020-03-13 LAB
ALBUMIN SERPL-MCNC: 4.1 G/DL (ref 3.8–4.8)
ALBUMIN/GLOB SERPL: 1.7 {RATIO} (ref 1.2–2.2)
ALP SERPL-CCNC: 94 IU/L (ref 39–117)
ALT SERPL-CCNC: 29 IU/L (ref 0–32)
AST SERPL-CCNC: 27 IU/L (ref 0–40)
BILIRUB SERPL-MCNC: 0.2 MG/DL (ref 0–1.2)
BUN SERPL-MCNC: 11 MG/DL (ref 6–24)
BUN/CREAT SERPL: 11 (ref 9–23)
CALCIUM SERPL-MCNC: 9 MG/DL (ref 8.7–10.2)
CHLORIDE SERPL-SCNC: 105 MMOL/L (ref 96–106)
CHOLEST SERPL-MCNC: 237 MG/DL (ref 100–199)
CO2 SERPL-SCNC: 20 MMOL/L (ref 20–29)
CREAT SERPL-MCNC: 0.97 MG/DL (ref 0.57–1)
ERYTHROCYTE [DISTWIDTH] IN BLOOD BY AUTOMATED COUNT: 13.3 % (ref 11.7–15.4)
GLOBULIN SER CALC-MCNC: 2.4 G/DL (ref 1.5–4.5)
GLUCOSE SERPL-MCNC: 118 MG/DL (ref 65–99)
HCT VFR BLD AUTO: 41.9 % (ref 34–46.6)
HDLC SERPL-MCNC: 45 MG/DL
HGB BLD-MCNC: 14.1 G/DL (ref 11.1–15.9)
LDLC SERPL CALC-MCNC: 130 MG/DL (ref 0–99)
MCH RBC QN AUTO: 30.2 PG (ref 26.6–33)
MCHC RBC AUTO-ENTMCNC: 33.7 G/DL (ref 31.5–35.7)
MCV RBC AUTO: 90 FL (ref 79–97)
PLATELET # BLD AUTO: 281 X10E3/UL (ref 150–450)
POTASSIUM SERPL-SCNC: 4.1 MMOL/L (ref 3.5–5.2)
PROT SERPL-MCNC: 6.5 G/DL (ref 6–8.5)
RBC # BLD AUTO: 4.67 X10E6/UL (ref 3.77–5.28)
SODIUM SERPL-SCNC: 142 MMOL/L (ref 134–144)
TRIGL SERPL-MCNC: 312 MG/DL (ref 0–149)
TSH SERPL DL<=0.005 MIU/L-ACNC: 5.16 UIU/ML (ref 0.45–4.5)
VLDLC SERPL CALC-MCNC: 62 MG/DL (ref 5–40)
WBC # BLD AUTO: 6.2 X10E3/UL (ref 3.4–10.8)

## 2020-03-17 ENCOUNTER — APPOINTMENT (OUTPATIENT)
Dept: PHYSICAL THERAPY | Age: 50
End: 2020-03-17
Payer: MEDICAID

## 2020-03-20 NOTE — PROGRESS NOTES
[facility]   [address] [address], [address] [place] [address], [facility] Marc Cornejo   Phone: [phone] Fax: [phone]    Progress Note    Name: Augie Mccullough   : 1970   MD: Berny Kaufman MD       Treatment Diagnosis: Neck pain [M54.2]  Low back pain [M54.5]  Start of Care: 20    Visits from Start of Care: 5  Missed Visits: 1    Summary of Care: Pt therapy program has been put on hold temporarily due to COVID-19 precautions. Pt has been notified via telephone conversation and we will resume as needed once precautions are lifted and our clinic resumes normal operations. Thank you for your understanding and referral!     Short Term Goals: To be accomplished in 8 treatments:               1. Pt will be independent and compliant with HEP. - MET               2. Pt will report and demonstrate improved sitting/standing posture. - MET  Long Term Goals: To be accomplished in 16 treatments:               1. Pt will be independent and compliant with HEP. - MET               2. Pt will improve FOTO score by the MCID from 43 to 56 demonstrating improved overall function with decreased pain or discomfort. - unable to assess               3. Pt will report </= 2 headaches per week. - NOT MET               4. Pt will be able to tolerate work shifts without pain >3/10. - MET               5. Pt will demonstrate >/= 60 degrees cervical rotation bilaterally without complaints of increased pain. - unable to assess               6. Pt will be able to drive without complaints of neck pain. - MET       Juan Manuel Alvarez 3/20/2020     ________________________________________________________________________  NOTE TO PHYSICIAN:  Please complete the following and fax to: [facility]: Fax: [phone] . Ricardo Escalante Retain this original for your records. If you are unable to process this request in 24 hours, please contact our office.        ____ I have read the above report and request that my patient continue therapy with the following changes/special instructions:  ____ I have read the above report and request that my patient be discharged from therapy    Physician's Signature:_________________ Date:___________Time:__________

## 2020-03-24 ENCOUNTER — OFFICE VISIT (OUTPATIENT)
Dept: NEUROLOGY | Age: 50
End: 2020-03-24

## 2020-03-24 ENCOUNTER — APPOINTMENT (OUTPATIENT)
Dept: PHYSICAL THERAPY | Age: 50
End: 2020-03-24
Payer: MEDICAID

## 2020-03-24 DIAGNOSIS — F31.81 BIPOLAR 2 DISORDER, MAJOR DEPRESSIVE EPISODE (HCC): ICD-10-CM

## 2020-03-24 DIAGNOSIS — F90.2 ADHD (ATTENTION DEFICIT HYPERACTIVITY DISORDER), COMBINED TYPE: Primary | ICD-10-CM

## 2020-03-24 DIAGNOSIS — F33.1 MODERATE EPISODE OF RECURRENT MAJOR DEPRESSIVE DISORDER (HCC): ICD-10-CM

## 2020-03-24 NOTE — LETTER
3/29/20 Patient: Luz Maria Fischer YOB: 1970 Date of Visit: 3/24/2020 Shen Sanon MD 
[address] [id number] VIA In Basket Dear Shen Sanon MD, Thank you for referring Ms. Luz Maria Fischer to [address] for evaluation. My notes for this consultation are attached. This note will not be viewable in [address] [facility] at [facility] [address] [facility], Medical Office [address] Office:  [phone]  Fax: [phone] Neuropsychological Evaluation Report Patient Name: Luz Maria Fischer Age: 51 y.o. Gender: female Occupation: [employer] 
Handedness: right handed Presenting Concern: ADHD vs Mood Disorder Primary Care Physician: Catherine Waters MD 
Referring Provider: Catherine Waters MD 
 
PATIENT HISTORY (OBTAINED DURING INITIAL CLINICAL EVALUATION): 
 
REASON FOR REFERRAL: 
This comprehensive and medically necessary neuropsychological assessment was requested to assist with a differential diagnosis of ADHD. The use and purpose of this examination, as well as the extent and limitations of confidentiality, were explained prior to obtaining permission to participate.   Instructions were provided regarding the necessity to put forth optimal effort and answer questions truthfully in order to obtain reliable and accurate test results. 
  
PERTINENT HISTORY: 
Ms. Radha Collins presented for a neuropsychological assessment at the recommendation of her treating physician secondary to complaints of memory problems, starting and finishing projects in a timely manner, maintaining conversations, difficulty concentrating staying focused, jumping from topic to topic in conversation, difficulty initiating and/or maintaining her sleep, increased anxiety and worry inability to relax, feeling slowed down, increased irritability and arguing, increased verbal aggressiveness towards others, feeling more easily frustrated, and less efficient with technology. She reports that she had no difficulty with making developmental milestones but had difficulty achieving in school. Her parents  in third grade and she moved several times with her mother and 3 brothers. Her mom was a single mom from about the age of 10 or 7. Her high school years were characterized as being somewhat difficult with her demonstrating significant temper issues, and oppositional behavior, low self-esteem and her dropping out in the 11th grade. From a brief review of her medical and personal history there has not been any other significant neurological injury or illness noted or reported. She describes herself as being somewhat of a wild child, often at odds with her mother through adolescence. She also reports numerous speeding tickets in the past.  She is currently a mother of 3 children and a single mother. 
  
Ms. Selma Rodriguez does not  exercise on a regular basis and does not  maintain a balanced diet. She does  report problems with sleep and does  complain of pain. She does  participate in mentally stimulating activities. Ms. Selma Rodriguez does  have concerns but did not describe her stressors. She is currently working as a [employer]. Ms. Selma Rodriguez indicated that she is independent in her instrumental activities of daily living, including shopping, meal preparation, housekeeping, doing laundry, driving a car, managing medications, and finances. 
  
 
METHODS OF ASSESSMENT (Current Evaluation): 
Clinician Administered: 
Clinical Interview Review of Medical Records Clock Drawing Task Megha Adult ADHD Rating Scale Modified Mini-Mental Status Exam (3MS) Test of Premorbid Functioning Personality Assessment Inventory (MORRIS) Mood Disorders Questionnaire (MDQ) Dumont Depression Inventory (BDI-2) Dumont Anxiety Inventory (EMANI) Revised Memory and Behavior Checklist 
 
Technician Administered: 
Davis's Continuous Performance Test -3 Neuropsychological Assessment Battery: Attention Module, Memory Module, Executive Module RBANS-A: Line Orientation, Figure Copy, Semantic Fluency, Picture Naming Structured Inventory of Malingering Symptoms Test of Memory Malingering Test of Practical Judgment (9-Item) Test of Premorbid Functioning [place] Functional Living Scale Trail Making Test 
      [employer] Test 
 
TEST OBSERVATIONS: 
Ms. Elle Kohli arrived promptly for the testing session. Dress and grooming were appropriate; physical presentation was unchanged from that observed during the clinical interview. Speech was fluent, intelligible, and goal-directed. Affect was congruent with the euthymic mood conveyed. Ms. Elle Kohli was adequately cooperative and appeared to put forth good effort throughout this examination. Rapport with the examiner was adequately established and maintained. Minimal prompting was required. Comprehension of test instructions was not problematic. Performance motivation was objectively measured by three instruments (Reliable Digits, SAMSON, TOMM), and Ms. Roberts produced a normal score on these measures. Accordingly, test findings below do not appear to be the product of disingenuous effort. Given the above observations, plus comments contained in the Mental Status section, the results of this examination are regarded as reasonably reliable and valid. TEST RESULTS: 
Quantitative test results are derived from comparisons to age and education corrected cohort normative data, where applicable. Percentiles are included in these instances. Qualitative test results are determined using clinical observations. General Orientation and Awareness:      
Orientation to person, year, month, day of month, day of week, state, town, and circumstance. Awareness of deficits: Aware Cognitive performance validity testing: Valid Attention/Concentration:      Classification:   
Simple visuomotor tracking (7 percentile)               Mildly Impaired Digits forward (3 percentile)                 Moderately Impaired Digits backward (58 percentile)                 Average Visual scanning (38 percentile)                            Average Simple information processing  efficiency (14 percentile)  Low Average Complex information processing efficiency (1 percentile)  Severely Impaired Attention to visual detail of driving scenes (24 percentile)             Low Average Megha Continuous Performance Test - 3 Variable Type Measure T-Score Percentile Guideline Detectability 
  
d'  
70  
98 Very Elevated Error Type Omissions 79  
>99 Very Elevated Commissions 64  
92 Elevated Perseverative Commissions 73  
99 Very Elevated Reaction Time HRT 
  
48  
42 Average HRT SD 
  
81  
>99 Very Elevated HRT Block Change 39  
14 Low Visuospatial and Constructional Praxis:    
Figure copy (68 percentile)                                      Average Line orientation (70 percentile)                                                 Average Language:        
Picture naming (70 percentile)                               Average Semantic fluency (27 percentile)                    Average Memory and Learning:      
Word list immediate recall (96 percentile)               Superior Word list short delayed recall (96 percentile)               Superior Word list long delayed recall (97 percentile)                          Superior Shape learning immediate recognition (96 percentile)   Superior Shape learning delayed recognition (93 percentile)              Superior Story learning immediate recall (73 percentile)     Average Story learning delayed recall (66 percentile)                          Average Daily living memory immediate recall (54 percentile)    Average Daily living memory delayed recall (86 percentile)              High Average Cognitive Tests of Executive Functioning:    
Ability to think flexibly, Trail Making B (18 percentile)               Low Average Mazes (16 percentile)                   Low Average Simple judgment in daily decision making (62 percentile)              Average Complex judgment in daily decision making (33 percentile)               Average Categories (82 percentile)                  High Average Word generation (92 percentile)                   Superior WCST Total Errors (58 percentile)       Average Perseverative Responses (77 percentile)     High Average Categories Completed (>16 percentile) Adaptive Behavioral Measure of Daily Functioning: 
 Time:             >75 %ile Money/calculations:   75 %ile Communication:    50 %ile Memory:     25%ile Total:     T= 47 (38%ile) Intellectual and Basic Cognitive Functioning (WAIS-IV): 
ACS Test of pre-morbid functioning reading recognition lower limit estimated WAIS-IV FSIQ score: 
     Estimated full scale IQ:            91     27 percentile    Average Emotional and Behavioral Functioning: 
  
 CAARS:  Ms. Cindy Linton was administered the Brigette Adult ADHD Rating Scales immediately before a face-to-face interview with the evaluator. Ms. Cindy Linton consistency index indicated a possibly invalid response pattern, so the reliability of her responses is concerning. Ms. Cindy Linton met criteria for the DSM-IV category of ADHD with significant difficulties that included elevated activity level, low self-esteem, difficulty keeping track of several things, remembering things, staying focused when working, restlessness, speaking impulsively, distractibility, and difficulty staying organized.   On the CAARS she produced 6 scales that were significantly elevated (out of 8) consistent with a mixed inattentive-impulsive type of ADHD. These symptoms are primarily experienced across environmental settings. Her subjective reports, while an important aspect of the diagnosis, seem highly consistent with her objective findings. EMANI:  21 Moderate Anxiety BDI-2:  19 Moderate Depression MDQ:  11/13 Criteria Met For Bipolar Disorder MORRIS: Ms. Gustabo Hollis was administered the MORRIS to ascertain her level of emotional functioning at the time of the evaluation. Her validity indicators were examined noted to fall outside the normal range, suggesting that she may not have answered in a completely forthright manner; the nature of her responses might lead to a somewhat inaccurate impression based on her style of responding. With respect to positive image management, there is no evidence to suggest that she was generally motivated to protray herself as being relatively or minor faults. With respect to negative impression management there is no evidence to suggest that she was motivated to protray herself as being more negative than her clinical picture would warrant. Her clinical profile indicates a number of difficulties consistent with a significant depressive experience. Quality of her depression seems primarily marked by physiological features, such as a disturbance in sleep pattern, decreased energy, poor appetite and/or loss of sexual interest.  There is also evidence of a decrease loss thought process marked by confusion, distractibility and difficulty concentrating. She may also have difficulty communicating clearly her thoughts and ideas with other people because of initial or circumstantial thoughts. Ms. Gustabo Hollis suggests that she is easily insulted or slighted and tends to respond by holding grudges towards others.  She is likely to attribute her own misfortunes to the neglect of others and to discredit the successes of others as being the result of luck or favoritism. Diagnostic considerations would include Major Depressive Disorder or Bipolar I I Disorder. IMPRESSIONS: 
Ms. Missy Hopson was seen for a comprehensive neuropsychological evaluation and administered a battery of measures that assessed her attention, visual-spatial, language, memory, and executive functioning. Her overall level of performance across all 5 domains yielded a total score that was in the average range. Individual domain scores were all in the average range with the exception of her attention. Within the attention domain her performance on individual measures indicated a decline in processing of information efficiency. Her performance on the CPT3, indicated a total of 7 atypical scores which is associated with a very high likelihood of having a disorder characterized by attention deficits, such as ADHD. Her profile of scores in response pattern indicates that she is likely to have did not deficits in inattentiveness, impulsivity, sustained attention, and vigilance. These findings are consistent with her report on the CAARS that is consistent with her having symptoms of ADHD. On other domains there was no indication impairment indicated. Assessment of her emotional functioning suggest that she is also experiencing a depressive disorder or Bipolar II Disorder that is further exacerbating her condition. It is felt that these are likely concomitant conditions, rather than one ruling out the other. DIAGNOSTIC IMPRESSIONS: 
  ICD-10-CM ICD-9-CM 1. ADHD (attention deficit hyperactivity disorder), combined type F90.2 314.01   
2. Moderate episode of recurrent major depressive disorder ([facility]) F33.1 296.32   
3. Bipolar 2 disorder, major depressive [facility] F31.81 296.89 RECOMMENDATIONS:  
1. Findings should be reviewed with Ms. Missy Hopson to insure her understanding and discuss the potential implications. 2. Emphasis should be placed on Ms. Roberts obtaining good sleep hygiene and maintaining adequate physical exercise to promote good brain health. 3. Ms. Cindy Linton may benefit from a referral to psychotherapy to address anxiety and depression. 4. Ms. Cindy Linton should be considered for a psychiatric referral for medication for bipolar disorder and ADHD. 5. Ms. Cindy Linton is encouraged to seek out various forms of mental stimulation that would help to \"exercise\" her brain. This might include learning a new skill, hobby, travel, attending lectures, or evening reading or listening to podcasts or videos on topics of her interest. 
 
 
Thank you for allowing me the opportunity to assist you in Ms. Roberts's care. Please do not hesitate to contact me should you have additional questions that I may not have addressed. 96136 x 1 
96138 x 1 
96139 x 6 
96132 x 1 
96133 x 2 Pepito Cheney, Ph.D., ABPP Licensed Clinical Psychologist 
Neuropsychologist 
Board Certified Rehabilitation Psychologist 
 
 
Time Documentation: 
  
76494 x 1: Neurobehavioral Status Exam/Clinical Interview: (1 hour, (already billed on first date of service) 53738*4 Neuropsych testing/data gathering by Neuropsychologist (35 additional minutes, see above) 08861 x 1 
96139 x 6 Test Administration/Data Gathering By Technician: (3.5 hours). 33968 x 1 (first 30 minutes), 96139 x 7 (each additional 30 minutes) 23899 x 1 
96133 x 1 Testing Evaluation Services by Neuropsychologist (1 hour, 50 minutes) 96132 x 1 (first hour), 96133 x 1 (50 minutes) Definitions:   
  
44629/65670:  Neurobehavioral Status Exam, Clinical interview. Clinical assessment of thinking, reasoning and judgment, by neuropsychologist, both face to face time with patient and time interpreting those test results and reporting, first and subsequent hours) 43930/46812: Neuropsychological Test Administration by Technician/Psychometrist, first 30 minutes and each additional 30 minutes. The above includes: Record review. Review of history provided by patient. Review of collaborative information. Testing by Clinician. Review of raw data. Scoring. Report writing of individual tests administered by Clinician. Integration of individual tests administered by psychometrist with NSE/testing by clinician, review of records/history/collaborative information, case Conceptualization, treatment planning, clinical decision making, report writing, coordination Of Care. Psychometry test codes as time spent by psychometrist administering and scoring neurocognitive/psychological tests under supervision of neuropsychologist.  Integral services including scoring of raw data, data interpretation, case conceptualization, report writing etcetera were initiated after the patient finished testing/raw data collected and was completed on the date the report was signed. This note will not be viewable in [address] If you have questions, please do not hesitate to call me. I look forward to following your patient along with you.  
 
 
Sincerely, 
 
Krissy Solis, PHD

## 2020-03-25 ENCOUNTER — TELEPHONE (OUTPATIENT)
Dept: PHYSICAL THERAPY | Age: 50
End: 2020-03-25

## 2020-03-26 NOTE — PROGRESS NOTES
This note will not be viewable in [address] [facility] at [place] [address] [address] [place] [address]    Office:  [phone]  Fax: [phone]                                         Neuropsychological Evaluation Report    Patient Name: Han Ramires  Age: 51 y.o. Gender: female   Occupation: [employer]  Handedness: right handed   Presenting Concern: ADHD vs Mood Disorder  Primary Care Physician: Mac Toledo MD  Referring Provider: Mac Toledo MD    PATIENT HISTORY (OBTAINED DURING INITIAL CLINICAL EVALUATION):    REASON FOR REFERRAL:  This comprehensive and medically necessary neuropsychological assessment was requested to assist with a differential diagnosis of ADHD. The use and purpose of this examination, as well as the extent and limitations of confidentiality, were explained prior to obtaining permission to participate. Instructions were provided regarding the necessity to put forth optimal effort and answer questions truthfully in order to obtain reliable and accurate test results.     PERTINENT HISTORY:  Ms. Estefania Wellington presented for a neuropsychological assessment at the recommendation of her treating physician secondary to complaints of memory problems, starting and finishing projects in a timely manner, maintaining conversations, difficulty concentrating staying focused, jumping from topic to topic in conversation, difficulty initiating and/or maintaining her sleep, increased anxiety and worry inability to relax, feeling slowed down, increased irritability and arguing, increased verbal aggressiveness towards others, feeling more easily frustrated, and less efficient with technology. She reports that she had no difficulty with making developmental milestones but had difficulty achieving in school. Her parents  in third grade and she moved several times with her mother and 3 brothers.   Her mom was a single mom from about the age of 10 or 7. Her high school years were characterized as being somewhat difficult with her demonstrating significant temper issues, and oppositional behavior, low self-esteem and her dropping out in the 11th grade. From a brief review of her medical and personal history there has not been any other significant neurological injury or illness noted or reported. She describes herself as being somewhat of a wild child, often at odds with her mother through adolescence. She also reports numerous speeding tickets in the past.  She is currently a mother of 3 children and a single mother.     Ms. Lavon Robles does not  exercise on a regular basis and does not  maintain a balanced diet. She does  report problems with sleep and does  complain of pain. She does  participate in mentally stimulating activities. Ms. Lavon Robles does  have concerns but did not describe her stressors.  She is currently working as a [employer]. Ms. Lavon Robles indicated that she is independent in her instrumental activities of daily living, including shopping, meal preparation, housekeeping, doing laundry, driving a car, managing medications, and finances.       METHODS OF ASSESSMENT (Current Evaluation):  Clinician Administered:  Clinical Interview  Review of Medical Records  Clock Drawing Task  Megha Adult ADHD Rating Scale  Modified Mini-Mental Status Exam (3MS)  Test of Premorbid Functioning  Personality Assessment Inventory (MORRIS)  Mood Disorders Questionnaire (MDQ)  Dumont Depression Inventory (BDI-2)  Dumont Anxiety Inventory (EMANI)  Revised Memory and Behavior Checklist    Technician Administered:  Davis's Continuous Performance Test -3  Neuropsychological Assessment Battery: Attention Module, Memory Module, Executive Module  RBANS-A: Line Orientation, Figure Copy, Semantic Fluency, Picture Naming  Structured Inventory of Malingering Symptoms  Test of Memory Malingering  Test of Practical Judgment (9-Item)        Test of Premorbid Functioning  [place] Functional Living Scale  Trail Making Test        [facility] Card Sorting Test    TEST OBSERVATIONS:  Ms. Nancy Haque arrived promptly for the testing session. Dress and grooming were appropriate; physical presentation was unchanged from that observed during the clinical interview. Speech was fluent, intelligible, and goal-directed. Affect was congruent with the euthymic mood conveyed. Ms. Nancy Haque was adequately cooperative and appeared to put forth good effort throughout this examination. Rapport with the examiner was adequately established and maintained. Minimal prompting was required. Comprehension of test instructions was not problematic. Performance motivation was objectively measured by three instruments (Reliable Digits, SAMSON, TOMM), and Ms. Roberts produced a normal score on these measures. Accordingly, test findings below do not appear to be the product of disingenuous effort. Given the above observations, plus comments contained in the Mental Status section, the results of this examination are regarded as reasonably reliable and valid. TEST RESULTS:  Quantitative test results are derived from comparisons to age and education corrected cohort normative data, where applicable. Percentiles are included in these instances. Qualitative test results are determined using clinical observations. General Orientation and Awareness:       Orientation to person, year, month, day of month, day of week, state, town, and circumstance.    Awareness of deficits: Aware                Cognitive performance validity testing: Valid        Attention/Concentration:      Classification:    Simple visuomotor tracking (7 percentile)               Mildly Impaired  Digits forward (3 percentile)                 Moderately Impaired  Digits backward (58 percentile)                 Average  Visual scanning (38 percentile)                            Average  Simple information processing  efficiency (14 percentile)  Low Average  Complex information processing efficiency (1 percentile)  Severely Impaired  Attention to visual detail of driving scenes (24 percentile)             Low Average    Megha Continuous Performance Test - 3    Variable Type Measure T-Score Percentile Guideline     Detectability     d'   70   98   Very Elevated     Error Type     Omissions     79   >99   Very Elevated      Commissions     64   92   Elevated    Perseverative  Commissions   73   99   Very Elevated     Reaction Time     HRT     48   42   Average        HRT SD     81   >99   Very Elevated      HRT Block  Change   39   14   Low         Visuospatial and Constructional Praxis:     Figure copy (68 percentile)                                      Average  Line orientation (70 percentile)                                                 Average       Language:         Picture naming (70 percentile)                               Average  Semantic fluency (27 percentile)                    Average      Memory and Learning:       Word list immediate recall (96 percentile)               Superior  Word list short delayed recall (96 percentile)               Superior  Word list long delayed recall (97 percentile)                          Superior  Shape learning immediate recognition (96 percentile)   Superior   Shape learning delayed recognition (93 percentile)              Superior  Story learning immediate recall (73 percentile)     Average  Story learning delayed recall (66 percentile)                          Average  Daily living memory immediate recall (54 percentile)    Average  Daily living memory delayed recall (86 percentile)              High Average    Cognitive Tests of Executive Functioning:     Ability to think flexibly, Trail Making B (18 percentile)               Low Average  Mazes (16 percentile)                   Low Average      Simple judgment in daily decision making (62 percentile)              Average  Complex judgment in daily decision making (33 percentile)               Average  Categories (82 percentile)                  High Average  Word generation (92 percentile)                   Superior  WCST        Total Errors (58 percentile)       Average        Perseverative Responses (77 percentile)     High Average        Categories Completed (>16 percentile)    Adaptive Behavioral Measure of Daily Functioning:   Time:             >75 %ile   Money/calculations:   75 %ile  Communication:    50 %ile   Memory:     25%ile    Total:     T= 47 (38%ile)      Intellectual and Basic Cognitive Functioning (WAIS-IV):  ACS Test of pre-morbid functioning reading recognition lower limit estimated WAIS-IV FSIQ score:       Estimated full scale IQ:            91     27 percentile    Average    Emotional and Behavioral Functioning:      CAARS:  Ms. Clari Reid was administered the Brigette Adult ADHD Rating Scales immediately before a face-to-face interview with the evaluator. Ms. Clari Reid consistency index indicated a possibly invalid response pattern, so the reliability of her responses is concerning. Ms. Clari Reid met criteria for the DSM-IV category of ADHD with significant difficulties that included elevated activity level, low self-esteem, difficulty keeping track of several things, remembering things, staying focused when working, restlessness, speaking impulsively, distractibility, and difficulty staying organized. On the CAARS she produced 6 scales that were significantly elevated (out of 8) consistent with a mixed inattentive-impulsive type of ADHD. These symptoms are primarily experienced across environmental settings. Her subjective reports, while an important aspect of the diagnosis, seem highly consistent with her objective findings.        EMANI:  21 Moderate Anxiety   BDI-2:  19 Moderate Depression   MDQ:  11/13 Criteria Met For Bipolar Disorder     MORRIS: Ms. Clari Reid was administered the MORRIS to ascertain her level of emotional functioning at the time of the evaluation. Her validity indicators were examined noted to fall outside the normal range, suggesting that she may not have answered in a completely forthright manner; the nature of her responses might lead to a somewhat inaccurate impression based on her style of responding. With respect to positive image management, there is no evidence to suggest that she was generally motivated to protray herself as being relatively or minor faults. With respect to negative impression management there is no evidence to suggest that she was motivated to protray herself as being more negative than her clinical picture would warrant. Her clinical profile indicates a number of difficulties consistent with a significant depressive experience. Quality of her depression seems primarily marked by physiological features, such as a disturbance in sleep pattern, decreased energy, poor appetite and/or loss of sexual interest.  There is also evidence of a decrease loss thought process marked by confusion, distractibility and difficulty concentrating. She may also have difficulty communicating clearly her thoughts and ideas with other people because of initial or circumstantial thoughts. Ms. Daryl Hidalgo suggests that she is easily insulted or slighted and tends to respond by holding grudges towards others. She is likely to attribute her own misfortunes to the neglect of others and to discredit the successes of others as being the result of luck or favoritism. Diagnostic considerations would include Major Depressive Disorder or Bipolar I I Disorder. IMPRESSIONS:  Ms. Daryl Hidalgo was seen for a comprehensive neuropsychological evaluation and administered a battery of measures that assessed her attention, visual-spatial, language, memory, and executive functioning. Her overall level of performance across all 5 domains yielded a total score that was in the average range.   Individual domain scores were all in the average range with the exception of her attention. Within the attention domain her performance on individual measures indicated a decline in processing of information efficiency. Her performance on the CPT-3, indicated a total of 7 atypical scores which is associated with a very high likelihood of having a disorder characterized by attention deficits, such as ADHD. Her profile of scores in response pattern indicates that she is likely to have did deficits in inattentiveness, impulsivity, sustained attention, and vigilance. These findings are consistent with her report on the CAARS that is consistent with her having symptoms of ADHD. On other domains there was no indication impairment indicated. Assessment of her emotional functioning suggest that she is also experiencing a depressive disorder or Bipolar II Disorder that is further exacerbating her condition. It is felt that these are likely concomitant conditions, rather than one ruling out the other. DIAGNOSTIC IMPRESSIONS:    ICD-10-CM ICD-9-CM    1. ADHD (attention deficit hyperactivity disorder), combined type F90.2 314.01 OH NEUROPSYCHOLOGICAL TST EVAL PHYS/QHP 1ST HOUR      OH NEUROPSYCHOLOGICAL TST EVAL PHYS/QHP EA ADDL HR      OH PSYL/NRPSYCL TST PHYS/QHP 2+ TST 1ST 30 MIN      OH PSYCL/NRPSYCL TST PHYS/QHP 2+ TST EA ADDL 30 MIN      OH PSYCL/NRPSYCL TST TECH 2+ TST 1ST 30 MIN      OH PSYCL/NRPSYCL TST TECH 2+ TST EA ADDL 30 MIN      OH PSYCL/NRPSYCL TST TECH 2+ TST EA ADDL 30 MIN      OH PSYCL/NRPSYCL TST TECH 2+ TST EA ADDL 30 MIN      OH PSYCL/NRPSYCL TST TECH 2+ TST EA ADDL 30 MIN      OH PSYCL/NRPSYCL TST TECH 2+ TST EA ADDL 30 MIN      OH NEUROBEHAVIORAL STATUS XM PHYS/QHP EA ADDL HOUR   2.  Moderate episode of recurrent major depressive disorder (HCC) F33.1 296.32 OH NEUROPSYCHOLOGICAL TST EVAL PHYS/QHP 1ST HOUR      OH NEUROPSYCHOLOGICAL TST EVAL PHYS/QHP EA ADDL HR      OH PSYL/NRPSYCL TST PHYS/QHP 2+ TST 1ST 30 MIN      OH PSYCL/NRPSYCL TST PHYS/QHP 2+ TST EA ADDL 30 MIN      SD PSYCL/NRPSYCL TST TECH 2+ TST 1ST 30 MIN   3. Bipolar 2 disorder, major depressive episode (HCC) F31.81 296.89 SD NEUROPSYCHOLOGICAL TST EVAL PHYS/QHP 1ST HOUR      SD NEUROPSYCHOLOGICAL TST EVAL PHYS/QHP EA ADDL HR      SD PSYL/NRPSYCL TST PHYS/QHP 2+ TST 1ST 30 MIN      SD PSYCL/NRPSYCL TST PHYS/QHP 2+ TST EA ADDL 30 MIN      SD PSYCL/NRPSYCL TST TECH 2+ TST 1ST 30 MIN         RECOMMENDATIONS:   1. Findings should be reviewed with Ms. Selma Rodriguez to insure her understanding and discuss the potential implications. 2. Emphasis should be placed on Ms. Roberts obtaining good sleep hygiene and maintaining adequate physical exercise to promote good brain health. 3. Ms. Selma Rodriguez may benefit from a referral to psychotherapy to address anxiety and depression. 4. Ms. Selma Rodriguez should be considered for a psychiatric referral for medication for bipolar disorder and ADHD. 5. Ms. Selma Rodriguez is encouraged to seek out various forms of mental stimulation that would help to \"exercise\" her brain. This might include learning a new skill, hobby, travel, attending lectures, or evening reading or listening to podcasts or videos on topics of her interest.      Thank you for allowing me the opportunity to assist you in Ms. Roberts's care. Please do not hesitate to contact me should you have additional questions that I may not have addressed. 27795 x 1  96138 x 1  96139 x 6  96132 x 1  96133 x 2          Steff Holstein, Ph.D., ABPP  Licensed Clinical Psychologist  Neuropsychologist  Board Certified Rehabilitation Psychologist      Time Documentation:     56831 x 1: Neurobehavioral Status Exam/Clinical Interview: (1 hour, (already billed on first date of service)     36438*4 Neuropsych testing/data gathering by Neuropsychologist (35 additional minutes, see above)      96138 x 1  96139 x 6 Test Administration/Data Gathering By Technician: (3.5 hours).  12292 x 1 (first 30 minutes), 43142 x 7 (each additional 30 minutes)     96132 x 1  96133 x 1 Testing Evaluation Services by Neuropsychologist (1 hour, 50 minutes) 96132 x 1 (first hour), 96133 x 1 (50 minutes)     Definitions:       43181/95129:  Neurobehavioral Status Exam, Clinical interview. Clinical assessment of thinking, reasoning and judgment, by neuropsychologist, both face to face time with patient and time interpreting those test results and reporting, first and subsequent hours)     60109/66112: Neuropsychological Test Administration by Technician/Psychometrist, first 30 minutes and each additional 30 minutes. The above includes: Record review. Review of history provided by patient. Review of collaborative information. Testing by Clinician. Review of raw data. Scoring. Report writing of individual tests administered by Clinician. Integration of individual tests administered by psychometrist with NSE/testing by clinician, review of records/history/collaborative information, case Conceptualization, treatment planning, clinical decision making, report writing, coordination Of Care. Psychometry test codes as time spent by psychometrist administering and scoring neurocognitive/psychological tests under supervision of neuropsychologist.  Integral services including scoring of raw data, data interpretation, case conceptualization, report writing etcetera were initiated after the patient finished testing/raw data collected and was completed on the date the report was signed. This note will not be viewable in [address]

## 2020-03-30 DIAGNOSIS — F90.9 ATTENTION DEFICIT HYPERACTIVITY DISORDER (ADHD), UNSPECIFIED ADHD TYPE: Primary | ICD-10-CM

## 2020-03-30 DIAGNOSIS — F31.81 BIPOLAR 2 DISORDER (HCC): ICD-10-CM

## 2020-04-03 ENCOUNTER — TELEPHONE (OUTPATIENT)
Dept: PHYSICAL THERAPY | Age: 50
End: 2020-04-03

## 2020-04-15 ENCOUNTER — VIRTUAL VISIT (OUTPATIENT)
Dept: NEUROLOGY | Age: 50
End: 2020-04-15

## 2020-04-15 DIAGNOSIS — F33.1 MODERATE EPISODE OF RECURRENT MAJOR DEPRESSIVE DISORDER (HCC): ICD-10-CM

## 2020-04-15 DIAGNOSIS — F31.81 BIPOLAR 2 DISORDER, MAJOR DEPRESSIVE EPISODE (HCC): ICD-10-CM

## 2020-04-15 DIAGNOSIS — F90.2 ADHD (ATTENTION DEFICIT HYPERACTIVITY DISORDER), COMBINED TYPE: Primary | ICD-10-CM

## 2020-04-15 NOTE — PROGRESS NOTES
This note will not be viewable in [address] Pursuant to the emergency declaration under the [facility] waiver authority and the [employer] Act, this Virtual Visit was conducted, with appropriate consent obtained, to reduce the patient's risk of exposure to COVID-19 and provide continuity of care   Services were provided in this manner to substitute for in-person clinic visit. The originating site is the patient's home and the distance site is [facility] at [facility]. These types of teleneuropsychology/telehealth/telemedicine visits were authorized by the President of the [place], though I/we cannot guarantee what a third party payor will do reimbursement/coverage wise. I indicated that I would evaluate the patient and recommend diagnostics and treatment based on my assessment and impressions, and that our sessions are not being recorded and that personal health information is protected to the best of our abilities. Saumya Alejandra is a 51 y.o. female who presents today for feedback following recent neuropsychological testing. The results were reviewed of the recent neuropsychological evaluation, including discussing individual tests as well as the patient's areas of neurocognitive strength versus their weaknesses. Education was provided regarding my diagnostic impressions, and we discussed next steps for further evaluation down the road. I all also answered numerous questions related to the clinical findings, including discussing various methods to improve cognitive cognition and mood when relevant. The patient is encouraged to follow-up with the referring provider. DIAGNOSTIC IMPRESSIONS:    ICD-10-CM ICD-9-CM    1. ADHD (attention deficit hyperactivity disorder), combined type F90.2 314.01    2.  Moderate episode of recurrent major depressive disorder ([facility] F33.1 296.32    3.  Bipolar 2 disorder, major depressive episode ([facility] F31.81 296.89        86403 30 minutes x 1    John Zimmerman, PHD
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
## 2021-03-29 ENCOUNTER — OFFICE VISIT (OUTPATIENT)
Dept: INTERNAL MEDICINE CLINIC | Age: 51
End: 2021-03-29
Payer: COMMERCIAL

## 2021-03-29 VITALS
BODY MASS INDEX: 39.27 KG/M2 | TEMPERATURE: 98 F | HEART RATE: 90 BPM | HEIGHT: 65 IN | OXYGEN SATURATION: 98 % | RESPIRATION RATE: 16 BRPM | DIASTOLIC BLOOD PRESSURE: 86 MMHG | SYSTOLIC BLOOD PRESSURE: 143 MMHG

## 2021-03-29 DIAGNOSIS — Z01.419 WELL WOMAN EXAM: Primary | ICD-10-CM

## 2021-03-29 DIAGNOSIS — R60.9 EDEMA, UNSPECIFIED TYPE: ICD-10-CM

## 2021-03-29 DIAGNOSIS — B35.1 ONYCHOMYCOSIS: ICD-10-CM

## 2021-03-29 DIAGNOSIS — E03.9 HYPOTHYROIDISM, UNSPECIFIED TYPE: ICD-10-CM

## 2021-03-29 PROCEDURE — 99396 PREV VISIT EST AGE 40-64: CPT | Performed by: FAMILY MEDICINE

## 2021-03-29 PROCEDURE — 99214 OFFICE O/P EST MOD 30 MIN: CPT | Performed by: FAMILY MEDICINE

## 2021-03-29 RX ORDER — FUROSEMIDE 40 MG/1
40 TABLET ORAL DAILY
Qty: 90 TAB | Refills: 0 | Status: SHIPPED | OUTPATIENT
Start: 2021-03-29 | End: 2021-06-23

## 2021-03-29 RX ORDER — TERBINAFINE HYDROCHLORIDE 250 MG/1
250 TABLET ORAL DAILY
Qty: 30 TAB | Refills: 0 | Status: SHIPPED | OUTPATIENT
Start: 2021-03-29 | End: 2021-04-26 | Stop reason: SDUPTHER

## 2021-03-29 NOTE — PROGRESS NOTES
Chief Complaint   Patient presents with    Complete Physical     Patient is here for a wellness visit. she is a 53y.o. year old female who presents for CPE  Complete Physical Exam Questions:    LMP -  N/A  Last Pap (q 3 years, or q5 with HPV) - unknown  Last Mammogram (50-74 biennially)- unknown   Hx of abnl Pap - No    1. Do you follow a low fat diet?  no  2. Are you up to date on your Tdap (<10 years)? Yes  3. Have you ever had a Pneumovax vaccine (>65)? No   PCV13 No   PPSV23 No  4. Have you had Zoster vaccine (>60)? No  5. Have you had the HPV - Gardasil (13- 26)? No  6. Do you follow an exercise program?  no  7. Do you smoke?  no  8. Do you consider yourself overweight?  no  9. Is there a family history of CAD< age 48? Unknown  10. Is there a family history of Cancer?  no  11. Do you know your Cancer risks? No  12. Have you had a colonoscopy?  no  13. Have you been tested for HIV or other STI's? No HIV testing today(18-66 y/o)? No  14. Have had a bone density scan or DEXA done(>65)? No  15. Have you had an EKG performed in the last five years (>50)? No        Reviewed and agree with Nurse Note and duplicated in this note. Reviewed PmHx, RxHx, FmHx, SocHx, AllgHx and updated and dated in the chart. Family History   Problem Relation Age of Onset    Heart Disease Maternal Grandmother     Heart Disease Maternal Grandfather     Stroke Maternal Grandfather     Cancer Paternal Grandfather        Past Medical History:   Diagnosis Date    Hypothyroidism     Musculoskeletal disorder 6/2011    numbness/pain to left arm    Thyroid disease       Social History     Socioeconomic History    Marital status: SINGLE     Spouse name: Not on file    Number of children: Not on file    Years of education: Not on file    Highest education level: Not on file   Tobacco Use    Smoking status: Never Smoker    Smokeless tobacco: Never Used   Substance and Sexual Activity    Alcohol use:  Yes Comment: occ    Drug use: No    Sexual activity: Not Currently   Social History Narrative    Single, 3 children, 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        Review of Systems - negative except as listed above      Objective:     Vitals:    03/29/21 1356   BP: (!) 143/86   Pulse: 90   Resp: 16   Temp: 98 °F (36.7 °C)   SpO2: 98%   Height: 5' 5\" (1.651 m)       Physical Examination: General appearance - alert, well appearing, and in no distress  Eyes - pupils equal and reactive, extraocular eye movements intact  Ears - bilateral TM's and external ear canals normal  Nose - normal and patent, no erythema, discharge or polyps  Mouth - mucous membranes moist, pharynx normal without lesions  Neck - supple, no significant adenopathy  Chest - clear to auscultation, no wheezes, rales or rhonchi, symmetric air entry  Heart - normal rate, regular rhythm, normal S1, S2, no murmurs, rubs, clicks or gallops  Abdomen - soft, nontender, nondistended, no masses or organomegaly  Neurological - alert, oriented, normal speech, no focal findings or movement disorder noted  Musculoskeletal - no joint tenderness, deformity or swelling  Extremities - peripheral pulses normal, no pedal edema, no clubbing or cyanosis        Assessment/ Plan:   Diagnoses and all orders for this visit:    1. Well woman exam  -     CBC W/O DIFF; Future  -     LIPID PANEL; Future  -     METABOLIC PANEL, COMPREHENSIVE; Future    2. Hypothyroidism, unspecified type  -     TSH 3RD GENERATION; Future    3. Edema, unspecified type           Labs to be drawn: CBC, CMP, Lipid            I have discussed the diagnosis with the patient and the intended plan as seen in the above orders. The patient has received an after-visit summary and questions were answered concerning future plans.    Medication Side Effects and Warnings were discussed with patient,  Patient Labs were reviewed and or requested, and  Patient Past Records were reviewed and or requested  yes       Chief Complaint   Patient presents with    Complete Physical     Patient is here for a wellness visit. she is a 53y.o. year old female who presents for evaluation of toenail fungus. Patient was seen by [facility] 2 weeks ago and started on Lamisil. Patient states that she has been tolerating medication fine and has 2 more weeks of therapy. Patient states that she has not noticed any changes to her nail plate. Patient states that is mainly affecting her left big toe, states her nail plate is thickened and brown discoloration. She has not had previous treatment  Patient also states that she has run out of her fluid pills and has noticed bilateral leg swelling. 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 Currently her feet do not have swelling on them. She denies any chest pain shortness of breath or CERVANTES. Patient also states that she has had slight weight gain over the last several months. She is taking her thyroid medication and is compliant. Patient believes her thyroid may be off but she is also having some easy fatigue. Denies any chest pain    Reviewed and agree with Nurse Note and duplicated in this note. Reviewed PmHx, RxHx, FmHx, SocHx, AllgHx and updated and dated in the chart.     Family History   Problem Relation Age of Onset    Heart Disease Maternal Grandmother     Heart Disease Maternal Grandfather     Stroke Maternal Grandfather     Cancer Paternal Grandfather        Past Medical History:   Diagnosis Date    Hypothyroidism     Musculoskeletal disorder 6/2011    numbness/pain to left arm    Thyroid disease       Social History     Socioeconomic History    Marital status: SINGLE     Spouse name: Not on file    Number of children: Not on file    Years of education: Not on file    Highest education level: Not on file   Tobacco Use    Smoking status: Never Smoker    Smokeless tobacco: Never Used   Substance and Sexual Activity    Alcohol use: Yes     Comment: occ    Drug use: No    Sexual activity: Not Currently   Social History Narrative    Single, 3 children, 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        Review of Systems - negative except as listed above      Objective:     Vitals:    03/29/21 1356   BP: (!) 143/86   Pulse: 90   Resp: 16   Temp: 98 °F (36.7 °C)   SpO2: 98%   Height: 5' 5\" (1.651 m)       Physical Examination: General appearance - alert, well appearing, and in no distress  Eyes - pupils equal and reactive, extraocular eye movements intact  Ears - bilateral TM's and external ear canals normal  Nose - normal and patent, no erythema, discharge or polyps  Mouth - mucous membranes moist, pharynx normal without lesions  Neck - supple, no significant adenopathy  Chest - clear to auscultation, no wheezes, rales or rhonchi, symmetric air entry  Heart - normal rate, regular rhythm, normal S1, S2, no murmurs, rubs, clicks or gallops  Abdomen - soft, nontender, nondistended, no masses or organomegaly  Extremities - peripheral pulses normal, mild pedal edema, no clubbing or cyanosis  Skin -onychomycosis noted on left great toe nail    Assessment/ Plan:   Diagnoses and all orders for this visit:    1. Well woman exam  -     CBC W/O DIFF; Future  -     LIPID PANEL; Future  -     METABOLIC PANEL, COMPREHENSIVE; Future    2. Hypothyroidism, unspecified type  -     TSH 3RD GENERATION; Future  Will adjust medications as needed for TSH levels  3. Edema, unspecified type  -     furosemide (LASIX) 40 mg tablet; Take 1 Tab by mouth daily. (needs appt for further refills)  Patient will take Lasix as needed, continue to monitor potassium  4. Onychomycosis  -     terbinafine HCL (LAMISIL) 250 mg tablet; Take 1 Tab by mouth daily. Patient will return to clinic every 4 weeks for further treatment of onychomycosis.   Will monitor for liver enzymes small potential for liver elevation due to Lamisil I have discussed the diagnosis with the patient and the intended plan as seen in the above orders. The patient has received an after-visit summary and questions were answered concerning future plans. Medication Side Effects and Warnings were discussed with patient,  Patient Labs were reviewed and or requested, and  Patient Past Records were reviewed and or requested  yes       Pt agrees to call or return to clinic and/or go to closest ER with any worsening of symptoms. This may include, but not limited to increased fever (>100.4) with NSAIDS or Tylenol, increased edema, confusion, rash, worsening of presenting symptoms. Please note that this dictation was completed with [employer], the computer voice recognition software. Quite often unanticipated grammatical, syntax, homophones, and other interpretive errors are inadvertently transcribed by the computer software. Please disregard these errors. Please excuse any errors that have escaped final proofreading. Thank you.

## 2021-03-30 LAB
ALBUMIN SERPL-MCNC: 4.4 G/DL (ref 3.5–5)
ALBUMIN/GLOB SERPL: 1.5 {RATIO} (ref 1.1–2.2)
ALP SERPL-CCNC: 123 U/L (ref 45–117)
ALT SERPL-CCNC: 35 U/L (ref 12–78)
ANION GAP SERPL CALC-SCNC: 9 MMOL/L (ref 5–15)
AST SERPL-CCNC: 18 U/L (ref 15–37)
BILIRUB SERPL-MCNC: 0.4 MG/DL (ref 0.2–1)
BUN SERPL-MCNC: 10 MG/DL (ref 6–20)
BUN/CREAT SERPL: 15 (ref 12–20)
CALCIUM SERPL-MCNC: 9.5 MG/DL (ref 8.5–10.1)
CHLORIDE SERPL-SCNC: 107 MMOL/L (ref 97–108)
CHOLEST SERPL-MCNC: 225 MG/DL
CO2 SERPL-SCNC: 25 MMOL/L (ref 21–32)
CREAT SERPL-MCNC: 0.67 MG/DL (ref 0.55–1.02)
ERYTHROCYTE [DISTWIDTH] IN BLOOD BY AUTOMATED COUNT: 13.1 % (ref 11.5–14.5)
GLOBULIN SER CALC-MCNC: 3 G/DL (ref 2–4)
GLUCOSE SERPL-MCNC: 92 MG/DL (ref 65–100)
HCT VFR BLD AUTO: 47.4 % (ref 35–47)
HDLC SERPL-MCNC: 58 MG/DL
HDLC SERPL: 3.9 {RATIO} (ref 0–5)
HGB BLD-MCNC: 14.9 G/DL (ref 11.5–16)
LDLC SERPL CALC-MCNC: 147.2 MG/DL (ref 0–100)
LIPID PROFILE,FLP: ABNORMAL
MCH RBC QN AUTO: 29.6 PG (ref 26–34)
MCHC RBC AUTO-ENTMCNC: 31.4 G/DL (ref 30–36.5)
MCV RBC AUTO: 94.2 FL (ref 80–99)
NRBC # BLD: 0 K/UL (ref 0–0.01)
NRBC BLD-RTO: 0 PER 100 WBC
PLATELET # BLD AUTO: 251 K/UL (ref 150–400)
PMV BLD AUTO: 11.5 FL (ref 8.9–12.9)
POTASSIUM SERPL-SCNC: 3.9 MMOL/L (ref 3.5–5.1)
PROT SERPL-MCNC: 7.4 G/DL (ref 6.4–8.2)
RBC # BLD AUTO: 5.03 M/UL (ref 3.8–5.2)
SODIUM SERPL-SCNC: 141 MMOL/L (ref 136–145)
TRIGL SERPL-MCNC: 99 MG/DL (ref ?–150)
TSH SERPL DL<=0.05 MIU/L-ACNC: 0.21 UIU/ML (ref 0.36–3.74)
VLDLC SERPL CALC-MCNC: 19.8 MG/DL
WBC # BLD AUTO: 5.9 K/UL (ref 3.6–11)

## 2021-03-31 RX ORDER — LEVOTHYROXINE SODIUM 150 UG/1
150 TABLET ORAL
Qty: 30 TAB | Refills: 3 | Status: SHIPPED | OUTPATIENT
Start: 2021-03-31 | End: 2021-06-30 | Stop reason: ALTCHOICE

## 2021-03-31 NOTE — PROGRESS NOTES
Your \"Bad\" cholesterol (LDL and/or triglycerides) are elevated. Please eat a healthier diet as described below. In particular avoid fried, fatty and junk foods, while increasing fiber (fruits and vegetables). If you cannot increase fiber through diet, you can supplement with metamucil as directed on bottle daily. Also, make sure you are taking 1 to 2 grams of over the counter fish oil. Increase exercise to 5 times per week of cardio lasting at least 30 min's each (biking, walking, elliptical, swimming). Lets recheck the fasting (atleast eight hours) in 6 months. Mediterranean diet: Choose this heart-healthy diet option  The Mediterranean diet is a heart-healthy eating plan combining elements of Mediterranean-style cooking. Here's how to adopt the Mediterranean diet. If you're looking for a heart-healthy eating plan, the Mediterranean diet might be right for you. The Mediterranean diet incorporates the basics of healthy eating  plus a splash of flavorful olive oil and perhaps a glass of red wine  among other components characterizing the traditional cooking style of countries bordering the [facility]. Most healthy diets include fruits, vegetables, fish and whole grains, and limit unhealthy fats. While these parts of a healthy diet remain tried-and-true, subtle variations or differences in proportions of certain foods may make a difference in your risk of heart disease. Benefits of the [address] has shown that the traditional Mediterranean diet reduces the risk of heart disease. In fact, a recent analysis of more than 1.5 million healthy adults demonstrated that following a Mediterranean diet was associated with a reduced risk of overall and cardiovascular mortality, a reduced incidence of cancer and cancer mortality, and a reduced incidence of Parkinson's and Alzheimer's diseases.    For this reason, most if not all major scientific organizations encourage healthy adults to adapt a style of eating like that of the [address] [facility] for prevention of major chronic diseases. Key components of the Mediterranean diet  The Mediterranean diet emphasizes:   Getting plenty of exercise   Eating primarily plant-based foods, such as fruits and vegetables, whole grains, legumes and nuts   Replacing butter with healthy fats such as olive oil and canola oil   Using herbs and spices instead of salt to flavor foods   Limiting red meat to no more than a few times a month   Eating fish and poultry at least twice a week   Drinking red wine in moderation (optional)   The diet also recognizes the importance of enjoying meals with family and friends. Fruits, vegetables, nuts and grains  The Mediterranean diet traditionally includes fruits, vegetables, pasta and rice. For example, residents of \[facility]\"" eat very little red meat and average nine servings a day of antioxidant-rich fruits and vegetables. The Mediterranean diet has been associated with a lower level of oxidized low-density lipoprotein (LDL) cholesterol  the \"bad\" cholesterol that's more likely to build up deposits in your arteries. Nuts are another part of a healthy Mediterranean diet. Nuts are high in fat (approximately 80 percent of their calories come from fat), but most of the fat is not saturated. Because nuts are high in calories, they should not be eaten in large amounts  generally no more than a handful a day. For the best nutrition, avoid candied or honey-roasted and heavily salted nuts. Grains in the [address] [place] [address] region are typically whole grain and usually contain very few unhealthy trans fats, and bread is an important part of the diet there. However, throughout the [address] [place] [address] region, bread is eaten plain or dipped in olive oil  not eaten with butter or margarines, which contain saturated or trans fats.

## 2021-04-26 ENCOUNTER — VIRTUAL VISIT (OUTPATIENT)
Dept: INTERNAL MEDICINE CLINIC | Age: 51
End: 2021-04-26
Payer: COMMERCIAL

## 2021-04-26 DIAGNOSIS — E03.9 HYPOTHYROIDISM, UNSPECIFIED TYPE: ICD-10-CM

## 2021-04-26 DIAGNOSIS — B35.1 ONYCHOMYCOSIS: Primary | ICD-10-CM

## 2021-04-26 DIAGNOSIS — R51.9 CHRONIC NONINTRACTABLE HEADACHE, UNSPECIFIED HEADACHE TYPE: ICD-10-CM

## 2021-04-26 DIAGNOSIS — G89.29 CHRONIC NONINTRACTABLE HEADACHE, UNSPECIFIED HEADACHE TYPE: ICD-10-CM

## 2021-04-26 PROCEDURE — 99214 OFFICE O/P EST MOD 30 MIN: CPT | Performed by: FAMILY MEDICINE

## 2021-04-26 RX ORDER — TERBINAFINE HYDROCHLORIDE 250 MG/1
250 TABLET ORAL DAILY
Qty: 30 TAB | Refills: 0 | Status: SHIPPED | OUTPATIENT
Start: 2021-04-26 | End: 2021-05-24 | Stop reason: SDUPTHER

## 2021-04-26 NOTE — PROGRESS NOTES
Ashley Yu is a 53 y.o. female who was seen by synchronous (real-time) audio-video technology on 4/26/2021 for Nail Problem        Assessment & Plan:   Diagnoses and all orders for this visit:    1. Onychomycosis  -     terbinafine HCL (LAMISIL) 250 mg tablet; Take 1 Tab by mouth daily. 2. Hypothyroidism, unspecified type  We will recheck thyroid hormone in 3 months  3. Chronic nonintractable headache, unspecified headache type  We will get second opinion from neurology due to patient's concussion history and chronic headaches. Subjective:   Patient is a 52-year-old female who is following up on onychomycosis. Patient states that she tolerating medication well but has not seen any changes yet although she is early in treatment. Patient also states that she is being treated for ADHD with Vyvanse by a psychiatrist and states that her chronic headaches have not gotten much better. Patient states the Vyvanse helps with her focus but her headaches are ongoing and usually occur in the front of her head over the posterior head. Patient states that these headaches are most daily and occur about midday and can last few hours. She will take over-the-counter Tylenol or Excedrin and this will usually help with the pain. Patient is to think that her headaches are due to concentration issues but noticed that even with being on Vyvanse and concentration issues going away that she still has these chronic headaches. Denies any trauma but does have a history of concussion    Prior to Admission medications    Medication Sig Start Date End Date Taking? Authorizing Provider   levothyroxine (SYNTHROID) 150 mcg tablet Take 1 Tab by mouth Daily (before breakfast). 3/31/21   Love Lewis MD   furosemide (LASIX) 40 mg tablet Take 1 Tab by mouth daily. (needs appt for further refills) 3/29/21   Love Lewis MD   terbinafine HCL (LAMISIL) 250 mg tablet Take 1 Tab by mouth daily.  3/29/21   Love Lewis MD   meloxicam (MOBIC) 15 mg tablet TAKE 1 TABLET BY MOUTH WITH FOOD DAILY FOR 2 WEEKS, THEN 1 TABLET EVERYDAY AS NEEDED FOR PAIN 12/4/19   Provider, Historical   levothyroxine (SYNTHROID) 125 mcg tablet Take 1 Tab by mouth Daily (before breakfast). 9/2/17   Adtii Valerio MD   loratadine (CLARITIN) 10 mg tablet Take 10 mg by mouth daily. Provider, Historical   EPINEPHrine (EPIPEN) 0.3 mg/0.3 mL injection 0.3 mL by IntraMUSCular route once as needed for 1 dose. 10/19/12   MILLER Oreilly     Patient Active Problem List    Diagnosis Date Noted    Severe obesity ([facility] [address] 01/30/2020    Hypothyroidism      Current Outpatient Medications   Medication Sig Dispense Refill    terbinafine HCL (LAMISIL) 250 mg tablet Take 1 Tab by mouth daily. 30 Tab 0    levothyroxine (SYNTHROID) 150 mcg tablet Take 1 Tab by mouth Daily (before breakfast). 30 Tab 3    furosemide (LASIX) 40 mg tablet Take 1 Tab by mouth daily. (needs appt for further refills) 90 Tab 0    meloxicam (MOBIC) 15 mg tablet TAKE 1 TABLET BY MOUTH WITH FOOD DAILY FOR 2 WEEKS, THEN 1 TABLET EVERYDAY AS NEEDED FOR PAIN      levothyroxine (SYNTHROID) 125 mcg tablet Take 1 Tab by mouth Daily (before breakfast). 90 Tab 2    loratadine (CLARITIN) 10 mg tablet Take 10 mg by mouth daily.  EPINEPHrine (EPIPEN) 0.3 mg/0.3 mL injection 0.3 mL by IntraMUSCular route once as needed for 1 dose.  0.6 mL 0     Allergies   Allergen Reactions    Diflucan [Fluconazole] Angioedema     Past Medical History:   Diagnosis Date    Hypothyroidism     Musculoskeletal disorder 6/2011    numbness/pain to left arm    Thyroid disease      Past Surgical History:   Procedure Laterality Date    ABDOMEN SURGERY PROC UNLISTED      weight loss implant in the left side of abd    HX ORTHOPAEDIC  1/13/14    C6-7 ANTERIOR CERVICAL DISCECTOMY WITH FUSION     Social History     Tobacco Use    Smoking status: Never Smoker    Smokeless tobacco: Never Used   Substance Use Topics    Alcohol use: Yes     Comment: occ       ROS    Objective:   No flowsheet data found. [INSTRUCTIONS:  \"[x]\" Indicates a positive item  \"[]\" Indicates a negative item  -- DELETE ALL ITEMS NOT EXAMINED]    Constitutional: [x] Appears well-developed and well-nourished [x] No apparent distress      [] Abnormal -     Mental status: [x] Alert and awake  [x] Oriented to person/place/time [x] Able to follow commands    [] Abnormal -     Eyes:   EOM    [x]  Normal    [] Abnormal -   Sclera  [x]  Normal    [] Abnormal -          Discharge [x]  None visible   [] Abnormal -     HENT: [x] Normocephalic, atraumatic  [] Abnormal -   [x] Mouth/Throat: Mucous membranes are moist    External Ears [x] Normal  [] Abnormal -    Neck: [x] No visualized mass [] Abnormal -     Pulmonary/Chest: [x] Respiratory effort normal   [x] No visualized signs of difficulty breathing or respiratory distress        [] Abnormal -      Musculoskeletal:   [x] Normal gait with no signs of ataxia         [x] Normal range of motion of neck        [] Abnormal -     Neurological:        [x] No Facial Asymmetry (Cranial nerve 7 motor function) (limited exam due to video visit)          [x] No gaze palsy        [] Abnormal -          Skin:        [x] No significant exanthematous lesions or discoloration noted on facial skin         [] Abnormal -            Psychiatric:       [x] Normal Affect [] Abnormal -        [x] No Hallucinations    Other pertinent observable physical exam findings:-        We discussed the expected course, resolution and complications of the diagnosis(es) in detail. Medication risks, benefits, costs, interactions, and alternatives were discussed as indicated. I advised her to contact the office if her condition worsens, changes or fails to improve as anticipated. She expressed understanding with the diagnosis(es) and plan. Acacia Hopper, was evaluated through a synchronous (real-time) audio-video encounter.  The patient (or guardian if applicable) is aware that this is a billable service. Verbal consent to proceed has been obtained within the past 12 months. The visit was conducted pursuant to the emergency declaration under the [address] [place] [address] authority and the [employer] General Act. Patient identification was verified, and a caregiver was present when appropriate. The patient was located in a state where the provider was credentialed to provide care.       Vijay Coleman MD

## 2021-05-24 ENCOUNTER — VIRTUAL VISIT (OUTPATIENT)
Dept: INTERNAL MEDICINE CLINIC | Age: 51
End: 2021-05-24
Payer: COMMERCIAL

## 2021-05-24 DIAGNOSIS — B35.1 ONYCHOMYCOSIS: Primary | ICD-10-CM

## 2021-05-24 PROCEDURE — 99214 OFFICE O/P EST MOD 30 MIN: CPT | Performed by: FAMILY MEDICINE

## 2021-05-24 RX ORDER — TERBINAFINE HYDROCHLORIDE 250 MG/1
250 TABLET ORAL DAILY
Qty: 30 TABLET | Refills: 0 | Status: SHIPPED | OUTPATIENT
Start: 2021-05-24 | End: 2021-08-17 | Stop reason: ALTCHOICE

## 2021-05-24 RX ORDER — TERBINAFINE HYDROCHLORIDE 250 MG/1
250 TABLET ORAL DAILY
Qty: 30 TABLET | Refills: 0 | Status: SHIPPED | OUTPATIENT
Start: 2021-05-24 | End: 2021-05-24 | Stop reason: SDUPTHER

## 2021-05-24 NOTE — PROGRESS NOTES
Eber Parker is a 53 y.o. female who was seen by synchronous (real-time) audio-video technology on 5/24/2021 for Nail Problem        Assessment & Plan:   Diagnoses and all orders for this visit:    1. Onychomycosis      Patient will continue with terbinafine  Likely several months until nail has cleared      Subjective:     Patient is a 25-year-old female who is following up for toenail fungus. Her left great toe has slight clearing at the base but still has discoloration of the rest of the nail plate. She has been taking her terbinafine this past month with no side effects noted. She is compliant with medication  Prior to Admission medications    Medication Sig Start Date End Date Taking? Authorizing Provider   terbinafine HCL (LAMISIL) 250 mg tablet Take 1 Tab by mouth daily. 4/26/21   Mando Kingston MD   levothyroxine (SYNTHROID) 150 mcg tablet Take 1 Tab by mouth Daily (before breakfast). 3/31/21   Mando Kingston MD   furosemide (LASIX) 40 mg tablet Take 1 Tab by mouth daily. (needs appt for further refills) 3/29/21   Mando Kingston MD   meloxicam (MOBIC) 15 mg tablet TAKE 1 TABLET BY MOUTH WITH FOOD DAILY FOR 2 WEEKS, THEN 1 TABLET EVERYDAY AS NEEDED FOR PAIN 12/4/19   Provider, Historical   levothyroxine (SYNTHROID) 125 mcg tablet Take 1 Tab by mouth Daily (before breakfast). 9/2/17   Addy Barreto MD   loratadine (CLARITIN) 10 mg tablet Take 10 mg by mouth daily. Provider, Historical   EPINEPHrine (EPIPEN) 0.3 mg/0.3 mL injection 0.3 mL by IntraMUSCular route once as needed for 1 dose. 10/19/12   MILLER Pereira     Patient Active Problem List    Diagnosis Date Noted    Severe obesity ([facility] [place] [address] 01/30/2020    Hypothyroidism      Current Outpatient Medications   Medication Sig Dispense Refill    terbinafine HCL (LAMISIL) 250 mg tablet Take 1 Tab by mouth daily. 30 Tab 0    levothyroxine (SYNTHROID) 150 mcg tablet Take 1 Tab by mouth Daily (before breakfast).  30 Tab 3    furosemide (LASIX) 40 mg tablet Take 1 Tab by mouth daily. (needs appt for further refills) 90 Tab 0    meloxicam (MOBIC) 15 mg tablet TAKE 1 TABLET BY MOUTH WITH FOOD DAILY FOR 2 WEEKS, THEN 1 TABLET EVERYDAY AS NEEDED FOR PAIN      levothyroxine (SYNTHROID) 125 mcg tablet Take 1 Tab by mouth Daily (before breakfast). 90 Tab 2    loratadine (CLARITIN) 10 mg tablet Take 10 mg by mouth daily.  EPINEPHrine (EPIPEN) 0.3 mg/0.3 mL injection 0.3 mL by IntraMUSCular route once as needed for 1 dose. 0.6 mL 0     Allergies   Allergen Reactions    Diflucan [Fluconazole] Angioedema     Past Medical History:   Diagnosis Date    Hypothyroidism     Musculoskeletal disorder 6/2011    numbness/pain to left arm    Thyroid disease      Past Surgical History:   Procedure Laterality Date    ABDOMEN SURGERY PROC UNLISTED      weight loss implant in the left side of abd    HX ORTHOPAEDIC  1/13/14    C6-7 ANTERIOR CERVICAL DISCECTOMY WITH FUSION     Family History   Problem Relation Age of Onset    Heart Disease Maternal Grandmother     Heart Disease Maternal Grandfather     Stroke Maternal Grandfather     Cancer Paternal Grandfather      Social History     Tobacco Use    Smoking status: Never Smoker    Smokeless tobacco: Never Used   Substance Use Topics    Alcohol use: Yes     Comment: occ       ROS    Objective:   No flowsheet data found.      [INSTRUCTIONS:  \"[x]\" Indicates a positive item  \"[]\" Indicates a negative item  -- DELETE ALL ITEMS NOT EXAMINED]    Constitutional: [x] Appears well-developed and well-nourished [x] No apparent distress      [] Abnormal -     Mental status: [x] Alert and awake  [x] Oriented to person/place/time [x] Able to follow commands    [] Abnormal -     Eyes:   EOM    [x]  Normal    [] Abnormal -   Sclera  [x]  Normal    [] Abnormal -          Discharge [x]  None visible   [] Abnormal -     HENT: [x] Normocephalic, atraumatic  [] Abnormal -   [x] Mouth/Throat: Mucous membranes are moist    External Ears [x] Normal  [] Abnormal -    Neck: [x] No visualized mass [] Abnormal -     Pulmonary/Chest: [x] Respiratory effort normal   [x] No visualized signs of difficulty breathing or respiratory distress        [] Abnormal -      Musculoskeletal:   [x] Normal gait with no signs of ataxia         [x] Normal range of motion of neck        [] Abnormal -     Neurological:        [x] No Facial Asymmetry (Cranial nerve 7 motor function) (limited exam due to video visit)          [x] No gaze palsy        [] Abnormal -          Skin:        [x] No significant exanthematous lesions or discoloration noted on facial skin         [] Abnormal -            Psychiatric:       [x] Normal Affect [] Abnormal -        [x] No Hallucinations    Other pertinent observable physical exam findings:-        We discussed the expected course, resolution and complications of the diagnosis(es) in detail. Medication risks, benefits, costs, interactions, and alternatives were discussed as indicated. I advised her to contact the office if her condition worsens, changes or fails to improve as anticipated. She expressed understanding with the diagnosis(es) and plan. Jerzy Green, was evaluated through a synchronous (real-time) audio-video encounter. The patient (or guardian if applicable) is aware that this is a billable service. Verbal consent to proceed has been obtained within the past 12 months. The visit was conducted pursuant to the emergency declaration under the [address] [place] [address] authority and the [employer] General Act. Patient identification was verified, and a caregiver was present when appropriate. The patient was located in a state where the provider was credentialed to provide care.       Walt Cardenas MD

## 2021-06-03 ENCOUNTER — OFFICE VISIT (OUTPATIENT)
Dept: NEUROLOGY | Age: 51
End: 2021-06-03
Payer: COMMERCIAL

## 2021-06-03 VITALS — RESPIRATION RATE: 18 BRPM | DIASTOLIC BLOOD PRESSURE: 80 MMHG | SYSTOLIC BLOOD PRESSURE: 128 MMHG

## 2021-06-03 DIAGNOSIS — G44.40 MEDICATION OVERUSE HEADACHE: Primary | ICD-10-CM

## 2021-06-03 PROCEDURE — 99204 OFFICE O/P NEW MOD 45 MIN: CPT | Performed by: PSYCHIATRY & NEUROLOGY

## 2021-06-03 RX ORDER — LANOLIN ALCOHOL/MO/W.PET/CERES
400 CREAM (GRAM) TOPICAL DAILY
Qty: 90 TABLET | Refills: 0 | Status: SHIPPED | OUTPATIENT
Start: 2021-06-03 | End: 2021-12-28

## 2021-06-03 RX ORDER — METHYLPREDNISOLONE 4 MG/1
TABLET ORAL
Qty: 1 DOSE PACK | Refills: 0 | Status: SHIPPED | OUTPATIENT
Start: 2021-06-03 | End: 2021-08-17 | Stop reason: ALTCHOICE

## 2021-06-03 RX ORDER — ACETAMINOPHEN 325 MG/1
TABLET ORAL
COMMUNITY
End: 2021-08-17

## 2021-06-03 RX ORDER — CHOLECALCIFEROL (VITAMIN D3) 125 MCG
CAPSULE ORAL
COMMUNITY
End: 2021-08-17
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
## 2021-06-23 DIAGNOSIS — R60.9 EDEMA, UNSPECIFIED TYPE: ICD-10-CM

## 2021-06-23 RX ORDER — FUROSEMIDE 40 MG/1
40 TABLET ORAL DAILY
Qty: 90 TABLET | Refills: 0 | Status: SHIPPED | OUTPATIENT
Start: 2021-06-23 | End: 2021-08-17

## 2021-06-29 ENCOUNTER — OFFICE VISIT (OUTPATIENT)
Dept: INTERNAL MEDICINE CLINIC | Age: 51
End: 2021-06-29
Payer: COMMERCIAL

## 2021-06-29 VITALS
HEART RATE: 80 BPM | RESPIRATION RATE: 16 BRPM | WEIGHT: 244 LBS | DIASTOLIC BLOOD PRESSURE: 83 MMHG | TEMPERATURE: 97.8 F | HEIGHT: 65 IN | OXYGEN SATURATION: 97 % | BODY MASS INDEX: 40.65 KG/M2 | SYSTOLIC BLOOD PRESSURE: 129 MMHG

## 2021-06-29 DIAGNOSIS — Z12.11 COLON CANCER SCREENING: ICD-10-CM

## 2021-06-29 DIAGNOSIS — Z12.31 ENCOUNTER FOR SCREENING MAMMOGRAM FOR MALIGNANT NEOPLASM OF BREAST: ICD-10-CM

## 2021-06-29 DIAGNOSIS — B35.1 ONYCHOMYCOSIS: Primary | ICD-10-CM

## 2021-06-29 DIAGNOSIS — Z11.59 NEED FOR HEPATITIS C SCREENING TEST: ICD-10-CM

## 2021-06-29 DIAGNOSIS — E78.5 ELEVATED LIPIDS: ICD-10-CM

## 2021-06-29 DIAGNOSIS — E03.9 HYPOTHYROIDISM, UNSPECIFIED TYPE: ICD-10-CM

## 2021-06-29 PROCEDURE — 99214 OFFICE O/P EST MOD 30 MIN: CPT | Performed by: FAMILY MEDICINE

## 2021-06-29 NOTE — PROGRESS NOTES
Chief Complaint   Patient presents with   [facility]     Patient is here for a follow up      she is a 49y.o. year old female who presents for evaluation of thyroid hormone. Patient states that she is taking her thyroid medication Synthroid 100 mcg as directed and has had no side effects. Patient states that she is doing well. She continues to have headaches no ear nose she is seeing neurology and been put on magnesium for these. Patient was diagnosed with rebound headaches but she disagrees with this. She does have a follow-up with neurology coming up next week. Patient is also following up on onychomycosis of left great toe. Patient states that she has been doing well with terbinafine and has no side effects. She states that she has noticed some mild clearing at the base of her large toenail. Patient also working on diet and exercise changes to help bring down her cholesterol level. She denies any chest pain shortness of breath CERVANTES  Reviewed and agree with Nurse Note and duplicated in this note. Reviewed PmHx, RxHx, FmHx, SocHx, AllgHx and updated and dated in the chart.     Family History   Problem Relation Age of Onset    Heart Disease Maternal Grandmother     Heart Disease Maternal Grandfather     Stroke Maternal Grandfather     Cancer Paternal Grandfather        Past Medical History:   Diagnosis Date    Hypothyroidism     Musculoskeletal disorder 6/2011    numbness/pain to left arm    Thyroid disease       Social History     Socioeconomic History    Marital status: SINGLE     Spouse name: Not on file    Number of children: Not on file    Years of education: Not on file    Highest education level: Not on file   Tobacco Use    Smoking status: Never Smoker    Smokeless tobacco: Never Used   Substance and Sexual Activity    Alcohol use: Yes     Comment: occ    Drug use: No    Sexual activity: Not Currently   Social History Narrative    Single, 3 children, 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     Social Determinants of Health     Financial Resource Strain:     Difficulty of Paying Living Expenses:    Food Insecurity:     Worried About Running Out of Food in the Last Year:     [address] in the Last Year:    Transportation Needs:     Lack of Transportation (Medical):  Lack of Transportation (Non-Medical):    Physical Activity:     Days of Exercise per Week:     Minutes of Exercise per Session:    Stress:     Feeling of Stress :    Social Connections:     Frequency of Communication with Friends and Family:     Frequency of Social Gatherings with Friends and Family:     Attends [employer] Services:     Active Member of Clubs or Organizations:     Attends Club or Organization Meetings:     Marital Status:         Review of Systems - negative except as listed above      Objective:     Vitals:    06/29/21 1015   BP: 129/83   Pulse: 80   Resp: 16   Temp: 97.8 °F (36.6 °C)   SpO2: 97%   Weight: 244 lb (110.7 kg)   Height: 5' 5\" (1.651 m)       Physical Examination: General appearance - alert, well appearing, and in no distress  Eyes - pupils equal and reactive, extraocular eye movements intact  Ears - bilateral TM's and external ear canals normal  Nose - normal and patent, no erythema, discharge or polyps  Mouth - mucous membranes moist, pharynx normal without lesions  Neck - supple, no significant adenopathy  Neurological - alert, oriented, normal speech, no focal findings or movement disorder noted  Musculoskeletal - no joint tenderness, deformity or swelling  Extremities - peripheral pulses normal, no pedal edema, no clubbing or cyanosis  Skin -onychomycosis noted on large great toe of left foot    Assessment/ Plan:   Diagnoses and all orders for this visit:    1. Onychomycosis  -     METABOLIC PANEL, COMPREHENSIVE; Future    2. Need for hepatitis C screening test  -     HEPATITIS C AB; Future    3. Hypothyroidism, unspecified type  -     T4, FREE;  Future  - TSH 3RD GENERATION; Future    4. Elevated lipids  -     LIPID PANEL; Future    5. Colon cancer screening  -     REFERRAL TO GASTROENTEROLOGY    6. Encounter for screening mammogram for malignant neoplasm of breast  -     YENY MAMMO BI SCREENING INCL CAD; Future           Neck mycosis May need to referral to podiatry for nail removal but will continue treating with terbinafine for this time being    I have discussed the diagnosis with the patient and the intended plan as seen in the above orders. The patient has received an after-visit summary and questions were answered concerning future plans. Medication Side Effects and Warnings were discussed with patient,  Patient Labs were reviewed and or requested, and  Patient Past Records were reviewed and or requested  yes       Pt agrees to call or return to clinic and/or go to closest ER with any worsening of symptoms. This may include, but not limited to increased fever (>100.4) with NSAIDS or Tylenol, increased edema, confusion, rash, worsening of presenting symptoms. Please note that this dictation was completed with [employer], the computer voice recognition software. Quite often unanticipated grammatical, syntax, homophones, and other interpretive errors are inadvertently transcribed by the computer software. Please disregard these errors. Please excuse any errors that have escaped final proofreading. Thank you.

## 2021-06-30 LAB
ALBUMIN SERPL-MCNC: 4.3 G/DL (ref 3.5–5)
ALBUMIN/GLOB SERPL: 1.5 {RATIO} (ref 1.1–2.2)
ALP SERPL-CCNC: 106 U/L (ref 45–117)
ALT SERPL-CCNC: 29 U/L (ref 12–78)
ANION GAP SERPL CALC-SCNC: 8 MMOL/L (ref 5–15)
AST SERPL-CCNC: 18 U/L (ref 15–37)
BILIRUB SERPL-MCNC: 0.3 MG/DL (ref 0.2–1)
BUN SERPL-MCNC: 15 MG/DL (ref 6–20)
BUN/CREAT SERPL: 25 (ref 12–20)
CALCIUM SERPL-MCNC: 9.2 MG/DL (ref 8.5–10.1)
CHLORIDE SERPL-SCNC: 107 MMOL/L (ref 97–108)
CHOLEST SERPL-MCNC: 256 MG/DL
CO2 SERPL-SCNC: 25 MMOL/L (ref 21–32)
COMMENT, HOLDF: NORMAL
CREAT SERPL-MCNC: 0.6 MG/DL (ref 0.55–1.02)
GLOBULIN SER CALC-MCNC: 2.9 G/DL (ref 2–4)
GLUCOSE SERPL-MCNC: 103 MG/DL (ref 65–100)
HCV AB SERPL QL IA: NONREACTIVE
HCV COMMENT,HCGAC: NORMAL
HDLC SERPL-MCNC: 56 MG/DL
HDLC SERPL: 4.6 {RATIO} (ref 0–5)
LDLC SERPL CALC-MCNC: 184.6 MG/DL (ref 0–100)
POTASSIUM SERPL-SCNC: 3.9 MMOL/L (ref 3.5–5.1)
PROT SERPL-MCNC: 7.2 G/DL (ref 6.4–8.2)
SAMPLES BEING HELD,HOLD: NORMAL
SODIUM SERPL-SCNC: 140 MMOL/L (ref 136–145)
T4 FREE SERPL-MCNC: 2.4 NG/DL (ref 0.8–1.5)
TRIGL SERPL-MCNC: 77 MG/DL (ref ?–150)
TSH SERPL DL<=0.05 MIU/L-ACNC: 0.1 UIU/ML (ref 0.36–3.74)
VLDLC SERPL CALC-MCNC: 15.4 MG/DL

## 2021-06-30 RX ORDER — LEVOTHYROXINE SODIUM 137 UG/1
137 TABLET ORAL
Qty: 30 TABLET | Refills: 3 | Status: SHIPPED | OUTPATIENT
Start: 2021-06-30 | End: 2021-10-04

## 2021-06-30 RX ORDER — ATORVASTATIN CALCIUM 20 MG/1
20 TABLET, FILM COATED ORAL DAILY
Qty: 30 TABLET | Refills: 3 | Status: SHIPPED | OUTPATIENT
Start: 2021-06-30 | End: 2021-08-17

## 2021-06-30 NOTE — PROGRESS NOTES
Your thyroid hormone is over treated and we will need to back down on your Synthroid. I will send in a new prescription to your pharmacy, please take as directed and will repeat in 3 monthsYour cholesterol has gone up significantly and I believe we should start a medication for this. I will send this to pharmacy I will repeat in 3 months.   Please continue work on diet and exercise to help in this time

## 2021-08-17 ENCOUNTER — OFFICE VISIT (OUTPATIENT)
Dept: NEUROLOGY | Age: 51
End: 2021-08-17
Payer: COMMERCIAL

## 2021-08-17 VITALS
SYSTOLIC BLOOD PRESSURE: 132 MMHG | WEIGHT: 238.7 LBS | HEIGHT: 65 IN | RESPIRATION RATE: 18 BRPM | HEART RATE: 86 BPM | DIASTOLIC BLOOD PRESSURE: 80 MMHG | BODY MASS INDEX: 39.77 KG/M2

## 2021-08-17 DIAGNOSIS — Z87.820 H/O CONCUSSION: ICD-10-CM

## 2021-08-17 PROCEDURE — 99214 OFFICE O/P EST MOD 30 MIN: CPT | Performed by: PSYCHIATRY & NEUROLOGY

## 2021-08-17 RX ORDER — NORTRIPTYLINE HYDROCHLORIDE 25 MG/1
25 CAPSULE ORAL
Qty: 30 CAPSULE | Refills: 2 | Status: SHIPPED | OUTPATIENT
Start: 2021-08-17 | End: 2021-12-28

## 2021-08-17 RX ORDER — SUMATRIPTAN 50 MG/1
50 TABLET, FILM COATED ORAL
Qty: 9 TABLET | Refills: 0 | Status: SHIPPED | OUTPATIENT
Start: 2021-08-17 | End: 2022-02-10

## 2021-08-17 RX ORDER — LISDEXAMFETAMINE DIMESYLATE 60 MG/1
CAPSULE ORAL
COMMUNITY
Start: 2021-08-09

## 2021-08-17 NOTE — PROGRESS NOTES
RM 4    Identified pt with two pt identifiers(name and ). Reviewed record in preparation for visit and have obtained necessary documentation. Chief Complaint   Patient presents with    Follow-up    Headache     pt states, episodes are worst after a busy day. Builds up during the day. A lot of pressure around crown of head. Vitals:    21 1046   BP: 132/80   Pulse: 86   Resp: 18   Weight: 238 lb 11.2 oz (108.3 kg)   Height: 5' 5\" (1.651 m)   PainSc:   7   PainLoc: Head   LMP: 2014       Health Maintenance Review: Patient reminded of \"due or due soon\" health maintenance. I have asked the patient to contact his/her primary care provider (PCP) for follow-up on his/her health maintenance. Coordination of Care Questionnaire:  :   1) Have you been to an emergency room, urgent care, or hospitalized since your last visit? If yes, where when, and reason for visit? no       2. Have seen or consulted any other health care provider since your last visit? If yes, where when, and reason for visit? YES  2021  [address]  [facility]   Pina Rodriguez MD  Family Medicine  Onychomycosis +5 more  Dx       Patient is accompanied by self I have received verbal consent from [facility] to discuss any/all medical information while they are present in the room.

## 2021-09-12 ENCOUNTER — HOSPITAL ENCOUNTER (EMERGENCY)
Dept: CT IMAGING | Age: 51
Discharge: HOME | End: 2021-09-12
Attending: PHYSICIAN ASSISTANT
Payer: COMMERCIAL

## 2021-09-12 ENCOUNTER — APPOINTMENT (OUTPATIENT)
Dept: GENERAL RADIOLOGY | Age: 51
End: 2021-09-12
Attending: PHYSICIAN ASSISTANT
Payer: COMMERCIAL

## 2021-09-12 ENCOUNTER — HOSPITAL ENCOUNTER (EMERGENCY)
Age: 51
Discharge: HOME | End: 2021-09-12
Attending: EMERGENCY MEDICINE
Payer: COMMERCIAL

## 2021-09-12 VITALS
HEART RATE: 93 BPM | TEMPERATURE: 97.4 F | SYSTOLIC BLOOD PRESSURE: 129 MMHG | RESPIRATION RATE: 18 BRPM | OXYGEN SATURATION: 98 % | DIASTOLIC BLOOD PRESSURE: 83 MMHG

## 2021-09-12 DIAGNOSIS — T14.8XXA BRUISING: ICD-10-CM

## 2021-09-12 DIAGNOSIS — M25.512 PAIN IN JOINT OF LEFT SHOULDER: ICD-10-CM

## 2021-09-12 DIAGNOSIS — M25.562 ACUTE PAIN OF LEFT KNEE: ICD-10-CM
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
## 2021-09-12 DIAGNOSIS — T14.8XXA ABRASION: ICD-10-CM

## 2021-09-12 LAB
APPEARANCE UR: CLEAR
BACTERIA URNS QL MICRO: NEGATIVE /HPF
BILIRUB UR QL: NEGATIVE
COLOR UR: NORMAL
EPITH CASTS URNS QL MICRO: NORMAL /LPF
GLUCOSE UR STRIP.AUTO-MCNC: NEGATIVE MG/DL
HCG UR QL: NEGATIVE
HGB UR QL STRIP: NEGATIVE
HYALINE CASTS URNS QL MICRO: NORMAL /LPF (ref 0–5)
KETONES UR QL STRIP.AUTO: NEGATIVE MG/DL
LEUKOCYTE ESTERASE UR QL STRIP.AUTO: NEGATIVE
NITRITE UR QL STRIP.AUTO: NEGATIVE
PH UR STRIP: 7 [PH] (ref 5–8)
PROT UR STRIP-MCNC: NEGATIVE MG/DL
RBC #/AREA URNS HPF: NORMAL /HPF (ref 0–5)
SP GR UR REFRACTOMETRY: 1.01 (ref 1–1.03)
UR CULT HOLD, URHOLD: NORMAL
UROBILINOGEN UR QL STRIP.AUTO: 0.2 EU/DL (ref 0.2–1)
WBC URNS QL MICRO: NORMAL /HPF (ref 0–4)

## 2021-09-12 PROCEDURE — 81025 URINE PREGNANCY TEST: CPT

## 2021-09-12 PROCEDURE — 99283 EMERGENCY DEPT VISIT LOW MDM: CPT

## 2021-09-12 PROCEDURE — 72125 CT NECK SPINE W/O DYE: CPT

## 2021-09-12 PROCEDURE — 73110 X-RAY EXAM OF WRIST: CPT

## 2021-09-12 PROCEDURE — 73030 X-RAY EXAM OF SHOULDER: CPT

## 2021-09-12 PROCEDURE — 70450 CT HEAD/BRAIN W/O DYE: CPT

## 2021-09-12 PROCEDURE — 74011250636 HC RX REV CODE- 250/636: Performed by: PHYSICIAN ASSISTANT

## 2021-09-12 PROCEDURE — 81001 URINALYSIS AUTO W/SCOPE: CPT

## 2021-09-12 PROCEDURE — 70486 CT MAXILLOFACIAL W/O DYE: CPT

## 2021-09-12 PROCEDURE — 96372 THER/PROPH/DIAG INJ SC/IM: CPT

## 2021-09-12 PROCEDURE — 73562 X-RAY EXAM OF KNEE 3: CPT

## 2021-09-12 RX ORDER — KETOROLAC TROMETHAMINE 30 MG/ML
30 INJECTION, SOLUTION INTRAMUSCULAR; INTRAVENOUS
Status: COMPLETED | OUTPATIENT
Start: 2021-09-12 | End: 2021-09-12

## 2021-09-12 RX ADMIN — KETOROLAC TROMETHAMINE 30 MG: 30 INJECTION, SOLUTION INTRAMUSCULAR; INTRAVENOUS at 16:44

## 2021-09-12 NOTE — DISCHARGE INSTRUCTIONS
Please perform gentle range of motion of the affected areas from your fall today without putting them under undue pressure to strain them. Please use Tylenol or Motrin as needed for pain. Please follow-up with your primary care.

## 2021-09-30 ENCOUNTER — OFFICE VISIT (OUTPATIENT)
Dept: INTERNAL MEDICINE CLINIC | Age: 51
End: 2021-09-30
Payer: COMMERCIAL

## 2021-09-30 VITALS
HEART RATE: 98 BPM | HEIGHT: 65 IN | BODY MASS INDEX: 39.82 KG/M2 | DIASTOLIC BLOOD PRESSURE: 83 MMHG | RESPIRATION RATE: 16 BRPM | WEIGHT: 239 LBS | TEMPERATURE: 98 F | SYSTOLIC BLOOD PRESSURE: 138 MMHG | OXYGEN SATURATION: 100 %

## 2021-09-30 DIAGNOSIS — E78.5 ELEVATED LIPIDS: ICD-10-CM

## 2021-09-30 DIAGNOSIS — M70.52 PATELLAR BURSITIS OF BOTH KNEES: ICD-10-CM

## 2021-09-30 DIAGNOSIS — E03.9 HYPOTHYROIDISM, UNSPECIFIED TYPE: Primary | ICD-10-CM

## 2021-09-30 DIAGNOSIS — M25.511 ACUTE PAIN OF BOTH SHOULDERS: ICD-10-CM

## 2021-09-30 DIAGNOSIS — M70.51 PATELLAR BURSITIS OF BOTH KNEES: ICD-10-CM

## 2021-09-30 DIAGNOSIS — M25.512 ACUTE PAIN OF BOTH SHOULDERS: ICD-10-CM

## 2021-09-30 PROCEDURE — 99214 OFFICE O/P EST MOD 30 MIN: CPT | Performed by: FAMILY MEDICINE

## 2021-09-30 NOTE — PROGRESS NOTES
Chief Complaint   Patient presents with   [facility] Follow Up     Patient is here for a hospital follow up. she is a 49y.o. year old female who presents for evaluation of hospital follow up. Patient states she went to the ER for a fall that occurred in September 11. Patient states that she tripped while she was going up the driveway and fell forward. Patient states that she was caught herself with her hands and knees, also states she bumped her chin. They did a CT scan of the ER and everything was normal, gave her a Ace bandage for knee. Patient states that pain is now better as she does not have any shoulder pain and or chin pain. Occasionally she presses on her kneecap on her left side she will have some 2 out of 10 pain but states that no pain with ambulation. Patient also states that she like to get her follow-up cholesterol and thyroid medications and labs reviewed today. She denies any weight loss or gain, cold intolerance or hair loss. Patient also states that she has been trying to healthier diet and is using herbals as much she does she can. Reviewed and agree with Nurse Note and duplicated in this note. Reviewed PmHx, RxHx, FmHx, SocHx, AllgHx and updated and dated in the chart.     Family History   Problem Relation Age of Onset    Heart Disease Maternal Grandmother     Heart Disease Maternal Grandfather     Stroke Maternal Grandfather     Cancer Paternal Grandfather        Past Medical History:   Diagnosis Date    Hypothyroidism     Musculoskeletal disorder 6/2011    numbness/pain to left arm    Thyroid disease       Social History     Socioeconomic History    Marital status: SINGLE     Spouse name: Not on file    Number of children: Not on file    Years of education: Not on file    Highest education level: Not on file   Tobacco Use    Smoking status: Never Smoker    Smokeless tobacco: Never Used   Vaping Use    Vaping Use: Never used   Substance and Sexual Activity    Progress Note  NEPHROLOGY    Reason for Consult:  KARISHMA on CKD    Requesting Physician:  Dr. Maeola Hodgkins    Chief Complaint:  Pain in legs and inability to walk    History Obtained From:  patient, electronic medical record    History of Present Ilness: This is a 77 y.o.  female with a H/O CKD stage IV with baseline creatinine of 1.5-2.0 over the past year, whom we have followed in the hospital in the past for KARISHMA most recently in August 2019. She had been seen a little over a year ago in the office by Dr. Nancy Rouse but has not been followed up since. Additional PMH: HTN, breast cancer s/p lumpectomy with bone mets, CAD, CABG, CHF EF:50-55 RVSP 30 mmHg , chronic venous insufficiency, DVT, B/l hip wounds, recently admitted for hip wounds that grew corynebacterium and S. Aureus and was discharged about 3 weeks ago. denies any recent NSAID use, no ACE/ARB. She was on merrem and vancomycin last admission in June her creatinine ranged 1.8-2.0 during that admission. She had a dose of vancomycin in the ED. She states she has been eating and drinking, she was on bumex PTA    She is awake and alert and is having LE pain as well as pain left hip from a chronic wound     7/9/20: seen & examined-had fallen asleep holding her sandwich. States she feels alright and has improved appetite. Past Medical History:        Diagnosis Date    Abscess of abdominal wall     Anemia     Iron deficiency and chronic disease.  Anesthesia     DIFFICULTY WAKING UP    Arthritis     Arthritis, hip     Breast cancer ([facility] [address] 2005    S/P Left Lumpectomy with Lymph Node Dissection, Chemo/Rad. Follows with Dr. Nabil Vo. No recurrence to date. Surgeon was Dr. Cele Kyle.  CAD (coronary artery disease) 5/2008    s/p CABG. Follows with [address] [place] [address].    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     Social Determinants of Health     Financial Resource Strain:     Difficulty of Paying Living Expenses:    Food Insecurity:     Worried About Running Out of Food in the Last Year:     Ran Out of Food in the Last Year:    Transportation Needs:     Lack of Transportation (Medical):      Lack of Transportation (Non-Medical):    Physical Activity:     Days of Exercise per Week:     Minutes of Exercise per Session:    Stress:     Feeling of Stress :    Social Connections:     Frequency of Communication with Friends and Family:     Frequency of Social Gatherings with Friends and Family:     Attends Sabianist Services:     Active Member of Clubs or Organizations:     Attends Club or Organization Meetings:     Marital Status:         Review of Systems - negative except as listed above      Objective:     Vitals:    09/30/21 1036   BP: 138/83   Pulse: 98   Resp: 16   Temp: 98 °F (36.7 °C)   SpO2: 100%   Weight: 239 lb (108.4 kg)   Height: 5' 5\" (1.651 m)       Physical Examination: General appearance - alert, well appearing, and in no distress  Eyes - pupils equal and reactive, extraocular eye movements intact  Ears - bilateral TM's and external ear canals normal  Nose - normal and patent, no erythema, discharge or polyps  Mouth - mucous membranes moist, pharynx normal without lesions  Neck - supple, no significant adenopathy  Chest - clear to auscultation, no wheezes, rales or rhonchi, symmetric air entry  Heart - normal rate, regular rhythm, normal S1, S2, no murmurs, rubs, clicks or gallops  Abdomen - soft, nontender, nondistended, no masses or organomegaly  Neurological - alert, oriented, normal speech, no focal findings or movement disorder noted  Musculoskeletal - The bilateral shoulder is  normal adult ([address] 8/1/2019    Decreased dorsalis pedis pulse 11/7/2018    Diabetic neuropathy (HCC)     Diabetic neuropathy (HCC)     DVT (deep venous thrombosis) (HCC)     Recurrent. On lifelong coumadin.  DVT of upper extremity (deep vein thrombosis) ([address] 4/18/2012    History of deep vein thrombosis 11/7/2018    Humerus fracture     Chronic, on the Left.  Hyperlipidemia 8/29/2011    Hypertension     Left leg pain 7/9/2020    Leg swelling 11/7/2018    Lymph node enlargement     Lymphedema of both lower extremities 11/7/2018    Lymphopenia 7/9/2020    MI (myocardial infarction) ([address]     Nephrolithiasis     Follows with Dr. Daniel Chisholm.  Pericardial effusion     Pulmonary nodule     With mediastinal lymphadenopathy. Stable. Follows with Dr. Jonny Rodríguez.  Rib lesion 11/28/11    expansile lesion in one of the right ribs laterally    Sarcoidosis 07/26/11    per transbronchial needle aspiration    Subclavian vein occlusion, bilateral ([address] 4/18/2012    Type II or unspecified type diabetes mellitus without mention of complication, not stated as uncontrolled        Past Surgical History:        Procedure Laterality Date    APPENDECTOMY      ARTERIAL BYPASS SURGRY      BREAST LUMPECTOMY  9/27/2005    LEFT    CARDIAC SURGERY      CHOLECYSTECTOMY      COLONOSCOPY  12/2007    cecal arteriovenous malformation with hyperplastic polyps    COLONOSCOPY  [id number]    CORONARY ARTERY BYPASS GRAFT  05/2008    triple bypass    ECHO COMPL W DOP COLOR FLOW  10/30/2013         EYE SURGERY      clarissa cataracts    HYSTERECTOMY  1975, 1985    MAYNOR prolapse, benign conditions; BSO later for scar tissues, no CA.      OTHER SURGICAL HISTORY  11/18/11    port removal right chest wall    PARACENTESIS      TONSILLECTOMY      TOOTH EXTRACTION      Full Dental Extraction.     TUNNELED VENOUS PORT PLACEMENT      removal of port Dec. 2011- Dr. Josesito Hernandez [facility]  [id number]    RIGHT CHEST Current Medications:    Current Facility-Administered Medications: lidocaine-EPINEPHrine 1 percent-1:[id number] injection 20 mL, 20 mL, Intradermal, Once  povidone-iodine (BETADINE) 10 % external solution, , Topical, PRN  mineral oil-hydrophilic petrolatum (HYDROPHOR) ointment, , Topical, BID **AND** mineral oil-hydrophilic petrolatum (HYDROPHOR) ointment, , Topical, BID PRN  albuterol (PROVENTIL) nebulizer solution 2.5 mg, 2.5 mg, Nebulization, Q4H PRN  [Held by provider] bumetanide (BUMEX) injection 1 mg, 1 mg, Intravenous, Daily  calcium-vitamin D (OSCAL-500) 500-200 MG-UNIT per tablet 1 tablet, 1 tablet, Oral, Daily  docusate sodium (COLACE) capsule 100 mg, 100 mg, Oral, Daily  isosorbide dinitrate (ISORDIL) tablet 5 mg, 5 mg, Oral, TID  magnesium oxide (MAG-OX) tablet 400 mg, 400 mg, Oral, Daily  metoprolol succinate (TOPROL XL) extended release tablet 12.5 mg, 12.5 mg, Oral, Daily  therapeutic multivitamin-minerals 1 tablet, 1 tablet, Oral, Daily  pantoprazole (PROTONIX) tablet 40 mg, 40 mg, Oral, QAM AC  pregabalin (LYRICA) capsule 50 mg, 50 mg, Oral, TID  vitamin B-12 (CYANOCOBALAMIN) tablet 1,000 mcg, 1,000 mcg, Oral, Daily  sodium chloride flush 0.9 % injection 10 mL, 10 mL, Intravenous, 2 times per day  sodium chloride flush 0.9 % injection 10 mL, 10 mL, Intravenous, PRN  acetaminophen (TYLENOL) tablet 650 mg, 650 mg, Oral, Q6H PRN **OR** acetaminophen (TYLENOL) suppository 650 mg, 650 mg, Rectal, Q6H PRN  magnesium hydroxide (MILK OF MAGNESIA) 400 MG/5ML suspension 30 mL, 30 mL, Oral, Daily PRN  promethazine (PHENERGAN) tablet 12.5 mg, 12.5 mg, Oral, Q6H PRN **OR** ondansetron (ZOFRAN) injection 4 mg, 4 mg, Intravenous, Q6H PRN  piperacillin-tazobactam (ZOSYN) 3.375 g in dextrose 5 % 100 mL IVPB extended infusion (mini-bag), 3.375 g, Intravenous, Q12H  0.9 % sodium chloride infusion admixture, , Intravenous, Q12H  0.9 % sodium chloride infusion, , Intravenous, Continuous  oxyCODONE (ROXICODONE) to inspection. The patient has full range of motion  . The shoulderis not tender to palpation . Bicep tendon: non-tender  The NEER test is negative. The Pollock test:is negative   The Cross over test:is  negative. The Empty Can test:is  negative. Stressed ext rotation is:  negative. Stressed int rotation: negative. The Apprehension Sign is negative. The Lift off test is:  negative   Examination reveals the Painful Arch:  negative. The Labral Test is:  negative. The Sulcus Sign is:  negative. Extremities - bilateral knee exam: Pain to palpation directly superficial to tell with no bruising noted  The patient'sbilateral Knee  is  normal to inspection. The ROM is normal and there is flexion to Normal Effusion is: absent The joint exhibits Negative warmth and Crepitus is: absent. The Teddy test is:  negative Joint Line Tenderness is  negative . The [facility] Test is negative  and the Lachman is  negative. The Anterior Drawer is negative. The Posterior Drawer is:  negative. Valgus Stress (for MCL) is:  normal . Varus Stress (for LCL) is  normal  . The Edgard Test is negative and the Apprehension Sign:  negative  Patellar Grind negative             Skin - normal coloration and turgor, no rashes, no suspicious skin lesions noted     Assessment/ Plan:   Diagnoses and all orders for this visit:    1. Hypothyroidism, unspecified type  -     TSH 3RD GENERATION; Future    2. Elevated lipids  -     LIPID PANEL; Future  -     METABOLIC PANEL, COMPREHENSIVE; Future    3. Acute pain of both shoulders  The patient signs that as well examining shoulders, patient will start home exercise program which is been given to patient  4. Patellar bursitis of both knees    Home exercise given to patient for patella bursitis, will take anti-inflammatories and anti-inflammatory herbals to help.       Follow-up as needed    I have discussed the diagnosis with the patient and the intended plan as seen in the above orders. The patient has received an after-visit summary and questions were answered concerning future plans. Medication Side Effects and Warnings were discussed with patient,  Patient Labs were reviewed and or requested, and  Patient Past Records were reviewed and or requested  yes       Pt agrees to call or return to clinic and/or go to closest ER with any worsening of symptoms. This may include, but not limited to increased fever (>100.4) with NSAIDS or Tylenol, increased edema, confusion, rash, worsening of presenting symptoms. Please note that this dictation was completed with [employer], the [employer] voice recognition software. Quite often unanticipated grammatical, syntax, homophones, and other interpretive errors are inadvertently transcribed by the computer software. Please disregard these errors. Please excuse any errors that have escaped final proofreading. Thank you. MYELOPCT 0.9 07/09/2020    BASOPCT 2.6 07/09/2020    MONOSABS 0.18 07/09/2020    LYMPHSABS 0.34 07/09/2020    EOSABS 0.04 07/09/2020    BASOSABS 0.07 07/09/2020     CMP:    Lab Results   Component Value Date     07/09/2020    K 4.2 07/09/2020     07/09/2020    CO2 25 07/09/2020    BUN 57 07/09/2020    CREATININE 2.8 07/09/2020    GFRAA 20 07/09/2020    LABGLOM 16 07/09/2020    GLUCOSE 154 07/09/2020    GLUCOSE 109 04/20/2012    PROT 7.5 07/07/2020    LABALBU 3.6 07/07/2020    LABALBU 4.4 03/25/2012    CALCIUM 7.8 07/09/2020    BILITOT 1.0 07/07/2020    ALKPHOS 144 07/07/2020    AST 34 07/07/2020    ALT 28 07/07/2020       RAD:  Xr Tibia Fibula Left (2 Views)    Result Date: 7/7/2020  3 views of the left knee, tibia, fibula and ankle. 3 views of the left foot. There is no evidence of acute displaced cortical disruption or dislocation. There is diffuse bone demineralization. There are rather severe degenerative changes of the knee, ankle, midfoot and toes. Arterial calcifications are present. Metallic clips overlie the medial soft tissues of the knee. No acute fracture is identified. Osteoarthritis. Osteopenia. Xr Tibia Fibula Right (2 Views)    Result Date: 7/7/2020  3 views of the right knee, tibia, fibula and ankle. 3 views of the left foot. There is no evidence of acute displaced cortical disruption or dislocation. There is diffuse bone demineralization. There are rather severe degenerative changes of the knee, ankle, midfoot and toes. Arterial calcifications are present. Metallic clips overlie the medial soft tissues of the knee. No acute fracture is identified. Osteoarthritis. Osteopenia. Xr Foot Left (min 3 Views)    Result Date: 7/7/2020  3 views of the left knee, tibia, fibula and ankle. 3 views of the left foot. There is no evidence of acute displaced cortical disruption or dislocation. There is diffuse bone demineralization.  There are rather severe degenerative changes of the knee, ankle, midfoot and toes. Arterial calcifications are present. Metallic clips overlie the medial soft tissues of the knee. No acute fracture is identified. Osteoarthritis. Osteopenia. Xr Foot Right (min 3 Views)    Result Date: 2020  3 views of the right knee, tibia, fibula and ankle. 3 views of the left foot. There is no evidence of acute displaced cortical disruption or dislocation. There is diffuse bone demineralization. There are rather severe degenerative changes of the knee, ankle, midfoot and toes. Arterial calcifications are present. Metallic clips overlie the medial soft tissues of the knee. No acute fracture is identified. Osteoarthritis. Osteopenia. Xr Chest Portable    Result Date: 2020  Patient MRN: [id number] : 1944 Age:  77 years Gender: Female Order Date: 2020 9:30 PM Exam: XR CHEST PORTABLE Number of Images: 1 view Indication:   sob sob Comparison: Prior chest radiograph from 2020 is available. Findings: Study demonstrate cardiomegaly with median sternotomy with left ventricular contour. There is a right-sided portacatheter tip is in superior vena cava. There is uncoiling atherosclerotic change of thoracic aorta. There is old right upper fourth and fifth rib fracture with callus formation. There is amputation of the left humeral neck. There is a comminuted humeral head fracture seen in the left glenoid fossa. There are multiple surgical clips seen in the left axillary region. The lung fields are clear. There is small right-sided pleural effusion     Cardiomegaly with median sternotomy Small right-sided pleural effusion Other findings are unchanged from prior study. Us Dup Lower Extremities Bilateral Venous    Result Date: 2020  Real-time and Doppler sonography of the deep venous structures of the lower extremities. Grayscale, color Doppler, and spectral Doppler analysis.  Occlusive and nonocclusive intraluminal substance is identified in the right common femoral, superficial femoral, popliteal, posterior tibial and peroneal veins. Baker's cyst on the left. There is adequate and spontaneous blood flow, compressibility and augmentation within the left common femoral, superficial femoral, popliteal, posterior tibial, anterior tibial and peroneal veins. Extensive deep vein thrombosis throughout the right lower extremity. Assessment/Plan    · Acute kidney injury-  Likely pre-renal in the setting of diuretics and  poor po intake with FeNa <1% and low urine urea  Not on ACE/ARB- was on diuretics  She has been started on IVF and will follow on same. R U/S No hydro, no calculus, right renal cyst noted  S Cr 3.4-->3.0->2.8 with IVF (0.9NS @ 75cc/hr)    · Chronic kidney disease stage IV-  Baseline Scr 1.5-2.0 over the past year    · Hypertension with CKD I-IV-  Well controlled on current medications    · Bilateral hip wounds  Seen by ID;no plans for antibiotics now     · Anemia with metastatic breast cancer-  Will defer to Hematology      Thank you for allowing us to participate in care of FORD Wilkins CNP  7/9/2020  1:38 PM     Patient seen and examined all key components of the physical performed independently , case discussed with NP, all pertinent labs and radiologic tests personally reviewed agree with above.       Narendra Alegre MD

## 2021-10-01 DIAGNOSIS — R60.9 EDEMA, UNSPECIFIED TYPE: ICD-10-CM

## 2021-10-01 LAB
ALBUMIN SERPL-MCNC: 3.8 G/DL (ref 3.5–5)
ALBUMIN/GLOB SERPL: 1.2 {RATIO} (ref 1.1–2.2)
ALP SERPL-CCNC: 128 U/L (ref 45–117)
ALT SERPL-CCNC: 25 U/L (ref 12–78)
ANION GAP SERPL CALC-SCNC: 8 MMOL/L (ref 5–15)
AST SERPL-CCNC: 16 U/L (ref 15–37)
BILIRUB SERPL-MCNC: 0.3 MG/DL (ref 0.2–1)
BUN SERPL-MCNC: 10 MG/DL (ref 6–20)
BUN/CREAT SERPL: 14 (ref 12–20)
CALCIUM SERPL-MCNC: 9.1 MG/DL (ref 8.5–10.1)
CHLORIDE SERPL-SCNC: 109 MMOL/L (ref 97–108)
CHOLEST SERPL-MCNC: 216 MG/DL
CO2 SERPL-SCNC: 23 MMOL/L (ref 21–32)
CREAT SERPL-MCNC: 0.69 MG/DL (ref 0.55–1.02)
GLOBULIN SER CALC-MCNC: 3.2 G/DL (ref 2–4)
GLUCOSE SERPL-MCNC: 95 MG/DL (ref 65–100)
HDLC SERPL-MCNC: 56 MG/DL
HDLC SERPL: 3.9 {RATIO} (ref 0–5)
LDLC SERPL CALC-MCNC: 139.6 MG/DL (ref 0–100)
POTASSIUM SERPL-SCNC: 4.2 MMOL/L (ref 3.5–5.1)
PROT SERPL-MCNC: 7 G/DL (ref 6.4–8.2)
SODIUM SERPL-SCNC: 140 MMOL/L (ref 136–145)
TRIGL SERPL-MCNC: 102 MG/DL (ref ?–150)
TSH SERPL DL<=0.05 MIU/L-ACNC: 1.34 UIU/ML (ref 0.36–3.74)
VLDLC SERPL CALC-MCNC: 20.4 MG/DL

## 2021-10-01 NOTE — PROGRESS NOTES
Cholesterol has come down but is still outside of the normal threshold.   Continue great work on diet and exercis, and we will repeat in 3 months

## 2021-10-04 RX ORDER — LEVOTHYROXINE SODIUM 137 UG/1
TABLET ORAL
Qty: 90 TABLET | Refills: 1 | Status: SHIPPED | OUTPATIENT
Start: 2021-10-04 | End: 2022-01-05 | Stop reason: ALTCHOICE

## 2021-10-04 RX ORDER — FUROSEMIDE 40 MG/1
40 TABLET ORAL DAILY
Qty: 90 TABLET | Refills: 0 | Status: SHIPPED | OUTPATIENT
Start: 2021-10-04 | End: 2021-12-27

## 2021-10-14 ENCOUNTER — TELEPHONE (OUTPATIENT)
Dept: NEUROLOGY | Age: 51
End: 2021-10-14

## 2021-10-14 NOTE — TELEPHONE ENCOUNTER
Called patient to r/s her appointment with -scheduled incorrectly. No answer. Left message to call back to r/s.

## 2021-12-27 DIAGNOSIS — R60.9 EDEMA, UNSPECIFIED TYPE: ICD-10-CM

## 2021-12-27 RX ORDER — FUROSEMIDE 40 MG/1
TABLET ORAL
Qty: 90 TABLET | Refills: 0 | Status: SHIPPED | OUTPATIENT
Start: 2021-12-27 | End: 2022-04-11

## 2021-12-28 ENCOUNTER — OFFICE VISIT (OUTPATIENT)
Dept: ENDOCRINOLOGY | Age: 51
End: 2021-12-28
Payer: COMMERCIAL

## 2021-12-28 VITALS
HEART RATE: 72 BPM | DIASTOLIC BLOOD PRESSURE: 74 MMHG | HEIGHT: 65 IN | SYSTOLIC BLOOD PRESSURE: 131 MMHG | BODY MASS INDEX: 38.69 KG/M2 | WEIGHT: 232.2 LBS

## 2021-12-28 DIAGNOSIS — E03.9 ACQUIRED HYPOTHYROIDISM: Primary | ICD-10-CM

## 2021-12-28 PROCEDURE — 99204 OFFICE O/P NEW MOD 45 MIN: CPT | Performed by: INTERNAL MEDICINE

## 2021-12-28 NOTE — PATIENT INSTRUCTIONS
1) I will repeat your thyroid tests to see if you need a change in your dose of levothyroxine and we may consider brand thyroid med again or a trial of T3 or natural thyroid hormone. 2) I will send you a message through [employer] with your lab results. 3) I will also screen you for Hashimoto's disease with a thyroid antibody panel.

## 2021-12-28 NOTE — PROGRESS NOTES
Chief Complaint   Patient presents with    Thyroid Problem     pcp and pharmacy confirmed     History of Present Illness: Arielle Grace is a 49 y.o. female who is a new patient for hypothyroidism. Was diagnosed with hypothyroidism in her late 25s about 1-2 years after she had her Norplant removed and has been on treatment ever since and always managed by her PCP. She is friends with Julia Kim who is a patient of mine and recommended she see me. Was initially on as much as 400 mcg of levothyroxine after diagnosis and then her dose started coming down and went through menopause around age 37 and was on brand synthroid 300 mcg daily in 2012 and her dose was too much and was decreased to 200 mcg daily and then over the next few years was changed to levothyroxine and her dose was decreased to as little as 125 mcg daily and TSH was normal at 2.6 in 7/17 but then TSH up to 5.16 in 3/20 and her dose was increased to 150 mcg daily and TSH 0.10 and FT4 2.4 in 6/21 and her dose was decreased to 137 mcg daily and TSH was 1.34 in 9/21 and her dose was kept the same. Takes the levothyroxine first thing in the morning with just water and waits at least 1-2 hours or more before eating. Doesn't drink coffee and not on any vitamins. Bowels are mostly regular but does drink smooth move tea at night or take an occ miralax to help. Does have some scalp hair loss. No brittle nails. Does have some dry skin that is unchanged. May get a hot flash on rare occasion but mostly is comfortable. Weight was as low as 170 lbs in 2013 when she was doing Medi weight loss and is currently up to 232 lbs but this is down from 238 lbs in 7/17. Has had some headaches and a sensation on her back as if her skin is drawing up. Has never been on natural thyroid hormone or cytomel.     Past Medical History:   Diagnosis Date    Concussion 11/2019    takes vyvanse to help with concentration after the concussion    Hypothyroidism     age 34    Musculoskeletal disorder 6/2011    numbness/pain to left arm    Seasonal allergic rhinitis      Past Surgical History:   Procedure Laterality Date    HX HIP REPLACEMENT Left 01/2019    HX ORTHOPAEDIC  1/13/14    C6-7 ANTERIOR CERVICAL DISCECTOMY WITH FUSION    SC ABDOMEN SURGERY PROC UNLISTED      weight loss implant in the left side of abd that studied her vagus nerve through [facility]     Current Outpatient Medications   Medication Sig    furosemide (LASIX) 40 mg tablet TAKE 1 TABLET BY MOUTH DAILY.  levothyroxine (SYNTHROID) 137 mcg tablet TAKE ONE TAB BY MOUTH DAILY BEFORE BREAKFAST    Vyvanse 60 mg capsule TAKE 1 CAPSULE BY MOUTH EVERY MORNING    loratadine (CLARITIN) 10 mg tablet Take 10 mg by mouth daily.  EPINEPHrine (EPIPEN) 0.3 mg/0.3 mL injection 0.3 mL by IntraMUSCular route once as needed for 1 dose. (Patient taking differently: 0.3 mg by IntraMUSCular route once as needed. Need refills)     No current facility-administered medications for this visit.      Allergies   Allergen Reactions    Diflucan [Fluconazole] Angioedema     Family History   Problem Relation Age of Onset    Pulmonary Fibrosis Mother     Prostate Cancer Father     Diabetes Father     Heart Disease Maternal Grandmother     Heart Disease Maternal Grandfather     Stroke Maternal Grandfather     Cancer Paternal Grandfather     Thyroid Disease Paternal Aunt         hypothyroidism    Pancreatic Cancer Paternal Aunt      Social History     Socioeconomic History    Marital status: SINGLE     Spouse name: Not on file    Number of children: Not on file    Years of education: Not on file    Highest education level: Not on file   Occupational History    Not on file   Tobacco Use    Smoking status: Never Smoker    Smokeless tobacco: Never Used   Vaping Use    Vaping Use: Never used   Substance and Sexual Activity    Alcohol use: Yes     Comment: a few times a year    Drug use: No    Sexual activity: Not Currently   Other Topics Concern    Not on file   Social History Narrative    Lives in [facility]. Has 2 sons and 1 daughter and 2 grandchildren. 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 Social Determinants of Health     Financial Resource Strain:     Difficulty of Paying Living Expenses: Not on file   Food Insecurity:     Worried About Running Out of Food in the Last Year: Not on file    Flaquita of Food in the Last Year: Not on file   Transportation Needs:     Lack of Transportation (Medical): Not on file    Lack of Transportation (Non-Medical):  Not on file   Physical Activity:     Days of Exercise per Week: Not on file    Minutes of Exercise per Session: Not on file   Stress:     Feeling of Stress : Not on file   Social Connections:     Frequency of Communication with Friends and Family: Not on file    Frequency of Social Gatherings with Friends and Family: Not on file    Attends Denominational Services: Not on file    Active Member of [address] [place] [address] or Organizations: Not on file    Attends Club or Organization Meetings: Not on file    Marital Status: Not on file   Intimate Partner Violence:     Fear of Current or Ex-Partner: Not on file    Emotionally Abused: Not on file    Physically Abused: Not on file    Sexually Abused: Not on file   Housing Stability:     Unable to Pay for Housing in the Last Year: Not on file    Number of [place] in the Last Year: Not on file    Unstable Housing in the Last Year: Not on file     Review of Systems: per HPI    Physical Examination:  Blood pressure 131/74, pulse 72, height 5' 5\" (1.651 m), weight 232 lb 3.2 oz (105.3 kg), last menstrual period 05/14/2014.  - General: pleasant, no distress, good eye contact  - HEENT: no exopthalmos, no periorbital edema, no scleral/conjunctival injection, EOMI, no lid lag or stare  - Neck: supple, small amount of palpable thyroid tissue without nodules or thyroid bruits,no masses, lymph nodes, or carotid bruits, no supraclavicular or dorsocervical fat pads  - Cardiovascular: regular, normal rate, normal S1 and S2, no murmurs/rubs/gallops   - Respiratory: clear to auscultation bilaterally  - Gastrointestinal: soft, nontender, nondistended, no masses, no hepatosplenomegaly  - Musculoskeletal: no proximal muscle weakness in upper or lower extremities  - Integumentary: no acanthosis nigricans, no rashes, no edema  - Neurological: reflexes 2+ at biceps, no tremor  - Psychiatric: normal mood and affect    Data Reviewed:   Component       T4, Free TSH   Latest Ref Rng & Units       0.8 - 1.5 NG/DL 0.36 - 3.74 uIU/mL   9/30/2021      8:45 PM  1.34   6/29/2021      10:51 AM 2.4 (H)    6/29/2021      10:51 AM  0.10 (L)   3/29/2021      2:18 PM  0.21 (L)   3/12/2020      2:40 PM  5.160 (H)   7/24/2017      11:10 AM 1.35    7/24/2017      11:10 AM  2.680   3/27/2017      5:07 PM  <0.006 (L)   3/27/2017      5:07 PM 2.43 (H)    2/10/2017      1:18 PM  <0.006 (L)   2/10/2017      1:18 PM 2.46 (H)    5/31/2016      5:27 PM 2.01 (H)    5/31/2016      5:27 PM  0.006 (L)   2/3/2015      1:54 PM 3.19 (H)    2/3/2015      1:54 PM  0.007 (L)   12/30/2013      5:07 PM  0.009 (L)   3/21/2012      4:41 PM  0.007 (L)   3/21/2012      4:41 PM 3.18 (H)    3/1/2011      5:16 PM 2.20 (H)    3/1/2011      5:16 PM  0.006 (L)   2/8/2010      4:34 PM  18.22 (H)   2/8/2010      4:34 PM 0.7 (L)      Assessment/Plan:   1. Acquired hypothyroidism: Was diagnosed with hypothyroidism in her late 25s about 1-2 years after she had her Norplant removed and has been on treatment ever since and always managed by her PCP.   Was initially on as much as 400 mcg of levothyroxine after diagnosis and then her dose started coming down and went through menopause around age 37 and was on brand synthroid 300 mcg daily in 2012 and her dose was too much and was decreased to 200 mcg daily and then over the next few years was changed to levothyroxine and her dose was decreased to as little as 125 mcg daily and TSH was normal at 2.6 in 7/17 but then TSH up to 5.16 in 3/20 and her dose was increased to 150 mcg daily and TSH 0.10 and FT4 2.4 in 6/21 and her dose was decreased to 137 mcg daily and TSH was 1.34 in 9/21 and her dose was kept the same. Unclear if some of the variability in her levels is due to variability in her weight and variability in generic levothyroxine so may benefit from going back on brand medication or a trial of T3. Will screen for hashimoto's. - check TSH, free T4, total T3 and Thyroid antibody panel today  - cont levothyroxine 137 mcg daily until labs are back          Patient Instructions   1) I will repeat your thyroid tests to see if you need a change in your dose of levothyroxine and we may consider brand thyroid med again or a trial of T3 or natural thyroid hormone. 2) I will send you a message through [employer] with your lab results. 3) I will also screen you for Hashimoto's disease with a thyroid antibody panel. Follow-up and Dispositions    · Return in about 6 months (around 6/28/2022). Copy sent to:  Henrique Magana MD     Lab follow up: 12/31/21    Component      Latest Ref Rng & Units 12/28/2021 12/28/2021 12/28/2021 12/28/2021          12:15 PM 12:15 PM 12:15 PM 12:15 PM   Thyroid peroxidase Ab      0 - 34 IU/mL <8      Thyroglobulin Ab      0.0 - 0.9 IU/mL <1.0      T3, total      71 - 180 ng/dL  94     TSH      0.36 - 3.74 uIU/mL    0.51   T4, Free      0.8 - 1.5 NG/DL   1.5      Sent her the following message through [employer]:  TSH is a thyroid test.  Your level is 0.51 which is normal and at goal of 0.45-2.0. This test goes opposite of your thyroid dose and suggests your dose of levothyroxine is perfect so the 137 mcg dose appears to be the right dose.   Your free T4 and T3 levels are normal so you won't clearly benefit from adding T3 but we could still consider changing to natural thyroid hormone or changing back to brand thyroid hormone with a different brand called levoxyl. Let me know if you want to try a different regimen or keep everything the same.   -------------------------------------------------------------------------------------------------------------------  Your thyroid peroxidase (TPO) antibody and thyroglobulin antibody levels were also normal which indicates that you do not have an autoimmune thyroid disease called Hashimoto's disease    Addendum: 01/05/22    We had the following [employer] exchange:    Sounds good. I sent it to your local pharmacy now.  '  -------------------------------------------------------------------------------------------------------------------  I put an order directly into the [employer] system to repeat your labs in the 1-2 weeks prior to your next visit so just ask for the order under my name and you will receive a courtesy reminder through [employer] to have these drawn.    ===View-only below this line===      ----- Message -----       From:Ruby Darden       Sent:1/4/2022 11:02 PM EST         To:Piotr Rahman MD    Subject:lab results    Thank you! I do think I want to try the brand Levoxyl   at 137mcg  Im happy that currently it seems all levels are good!    Happy New Year to you!!

## 2022-01-05 RX ORDER — LEVOTHYROXINE SODIUM 137 UG/1
137 TABLET ORAL
Qty: 90 TABLET | Refills: 3 | Status: SHIPPED | OUTPATIENT
Start: 2022-01-05 | End: 2022-06-28

## 2022-01-07 ENCOUNTER — TELEPHONE (OUTPATIENT)
Dept: ENDOCRINOLOGY | Age: 52
End: 2022-01-07

## 2022-01-07 NOTE — TELEPHONE ENCOUNTER
Please call pt to let her know she has an unread message in [employer]:    Sounds good.  I sent it to your local pharmacy now.  '   -------------------------------------------------------------------------------------------------------------------   I put an order directly into the [employer] system to repeat your labs in the 1-2 weeks prior to your next visit so just ask for the order under my name and you will receive a courtesy reminder through [employer] to have these drawn. Previous Messages    ----- Message -----        From:Rubyaysha Claudio [facility]   Sent:1/4/2022 11:02 PM EST          To:Piotr Ashraf MD     Subject:lab results     Thank you! I do think I want to try the brand Levoxyl   at 137mcg  Im happy that currently it seems all levels are good!    Happy New Year to you!!

## 2022-02-10 RX ORDER — SUMATRIPTAN 50 MG/1
50 TABLET, FILM COATED ORAL
Qty: 9 TABLET | Refills: 0 | Status: SHIPPED | OUTPATIENT
Start: 2022-02-10 | End: 2022-02-10

## 2022-03-19 PROBLEM — E66.01 SEVERE OBESITY (HCC): Status: ACTIVE | Noted: 2020-01-30

## 2022-04-01 DIAGNOSIS — R60.9 EDEMA, UNSPECIFIED TYPE: ICD-10-CM

## 2022-04-11 RX ORDER — FUROSEMIDE 40 MG/1
TABLET ORAL
Qty: 90 TABLET | Refills: 0 | Status: SHIPPED | OUTPATIENT
Start: 2022-04-11 | End: 2022-08-12

## 2022-06-14 DIAGNOSIS — E03.9 ACQUIRED HYPOTHYROIDISM: ICD-10-CM

## 2022-06-16 LAB
T4 FREE SERPL-MCNC: 1.37 NG/DL (ref 0.82–1.77)
TSH SERPL DL<=0.005 MIU/L-ACNC: 8.3 UIU/ML (ref 0.45–4.5)

## 2022-06-28 ENCOUNTER — OFFICE VISIT (OUTPATIENT)
Dept: ENDOCRINOLOGY | Age: 52
End: 2022-06-28
Payer: COMMERCIAL

## 2022-06-28 VITALS
DIASTOLIC BLOOD PRESSURE: 86 MMHG | HEIGHT: 65 IN | HEART RATE: 98 BPM | WEIGHT: 249 LBS | SYSTOLIC BLOOD PRESSURE: 138 MMHG | BODY MASS INDEX: 41.48 KG/M2

## 2022-06-28 DIAGNOSIS — E03.9 ACQUIRED HYPOTHYROIDISM: Primary | ICD-10-CM

## 2022-06-28 PROCEDURE — 99214 OFFICE O/P EST MOD 30 MIN: CPT | Performed by: INTERNAL MEDICINE

## 2022-06-28 RX ORDER — LEVOTHYROXINE SODIUM 137 UG/1
TABLET ORAL
Qty: 90 TABLET | Refills: 3
Start: 2022-06-28

## 2022-06-28 NOTE — PATIENT INSTRUCTIONS
1) TSH is a thyroid test.  Your level is 8.3 which is high and above goal of 0.45-2.0. This test goes opposite of your thyroid dose and suggests your dose of levoxyl is not enough for your current weight. I will increase your dose to 1 tab on Mon-Sat and 2 tabs on Sunday. Tomorrow only also take 2 tabs just to give yourself a boost.      2) Do this for 6 weeks and then repeat your labs and I'll be in touch through [employer] with the results. 3) Likely if we get your weight back down to 230 lbs, then you won't need the extra tab per week.

## 2022-06-28 NOTE — PROGRESS NOTES
Chief Complaint   Patient presents with    Thyroid Problem     PCP and Pharmacy verified     History of Present Illness: Vika Pike is a 49 y.o. female here for follow up of thyroid. Weight up 17 lbs since last visit in 12/21. Did have a back injury around [facility] when she stepped off a step awkwardly and this flared her sciatica. Has been taking the levoxyl 137 mcg daily in the morning with just water at least 30 min before any food and tea (doesn't drink coffee) and has not missed any doses. No new GERD meds or vitamins that would interfere. Did have a steroid pack that she had leftover that she took for pain but this finished on the 30th of May. Has felt more tired. Sometimes can have hot flashes at time and other times can be very cold during the day. Bowels are not as regular but doesn't feel constipated. Has had some increase in hair loss. Has noticed more leg swelling recently and has to take lasix more frequently. Doesn't think she is necessarily eating any more. Current Outpatient Medications   Medication Sig    furosemide (LASIX) 40 mg tablet TAKE 1 TABLET BY MOUTH EVERY DAY    LevoxyL 137 mcg tablet Take 1 Tablet by mouth Daily (before breakfast). BRAND MEDICALLY NECESSARY    Vyvanse 60 mg capsule TAKE 1 CAPSULE BY MOUTH EVERY MORNING    loratadine (CLARITIN) 10 mg tablet Take 10 mg by mouth daily.  EPINEPHrine (EPIPEN) 0.3 mg/0.3 mL injection 0.3 mL by IntraMUSCular route once as needed for 1 dose. (Patient taking differently: 0.3 mg by IntraMUSCular route once as needed. Need refills)     No current facility-administered medications for this visit.      Allergies   Allergen Reactions    Diflucan [Fluconazole] Angioedema     Review of Systems: PER HPI    Physical Examination:  Blood pressure 138/86, pulse 98, height 5' 5\" (1.651 m), weight 249 lb (112.9 kg), last menstrual period 05/14/2014.  - General: pleasant, no distress, good eye contact   - Neck: no thyromegaly - Cardiovascular: regular, normal rate, nl s1 and s2, no m/r/g   - Respiratory: clear to auscultation bilaterally   - Integumentary: skin is normal, no edema  - Neurological: reflexes 2+ at biceps, no tremors  - Psychiatric: normal mood and affect    Data Reviewed:   Component      Latest Ref Rng & Units 6/15/2022 6/15/2022           3:09 PM  3:09 PM   TSH      0.450 - 4.500 uIU/mL  8.300 (H)   T4, Free      0.82 - 1.77 ng/dL 1.37        Assessment/Plan:     1. Acquired hypothyroidism: Was diagnosed with hypothyroidism in her late 25s about 1-2 years after she had her Norplant removed and has been on treatment ever since and always managed by her PCP. Was initially on as much as 400 mcg of levothyroxine after diagnosis and then her dose started coming down and went through menopause around age 37 and was on brand synthroid 300 mcg daily in 2012 and her dose was too much and was decreased to 200 mcg daily and then over the next few years was changed to levothyroxine and her dose was decreased to as little as 125 mcg daily and TSH was normal at 2.6 in 7/17 but then TSH up to 5.16 in 3/20 and her dose was increased to 150 mcg daily and TSH 0.10 and FT4 2.4 in 6/21 and her dose was decreased to 137 mcg daily and TSH was 1.34 in 9/21 and her dose was kept the same. Unclear if some of the variability in her levels is due to variability in her weight and variability in generic levothyroxine. At initial visit with me in 12/21, TSH 0.51, T3 94, TPO ab <8 and TG ab < 1.0 so changed to brand levoxyl 137 mcg daily and TSH 8.3 in 6/22 as her wt is up 19 lbs so increased to 8 tabs/week. - check TSH, free T4 in 6 weeks and prior to next visit  - increase levoxyl 137 mcg to 8 tabs/week          Patient Instructions   1) TSH is a thyroid test.  Your level is 8.3 which is high and above goal of 0.45-2.0. This test goes opposite of your thyroid dose and suggests your dose of levoxyl is not enough for your current weight.   I will increase your dose to 1 tab on Mon-Sat and 2 tabs on Sunday. Tomorrow only also take 2 tabs just to give yourself a boost.      2) Do this for 6 weeks and then repeat your labs and I'll be in touch through [employer] with the results. 3) Likely if we get your weight back down to 230 lbs, then you won't need the extra tab per week. Follow-up and Dispositions    · Return in about 6 months (around 12/28/2022).                Copy sent to:  Houston Tidwell MD

## 2022-07-25 ENCOUNTER — OFFICE VISIT (OUTPATIENT)
Dept: INTERNAL MEDICINE CLINIC | Age: 52
End: 2022-07-25
Payer: COMMERCIAL

## 2022-07-25 VITALS
DIASTOLIC BLOOD PRESSURE: 89 MMHG | HEIGHT: 65 IN | SYSTOLIC BLOOD PRESSURE: 123 MMHG | BODY MASS INDEX: 40.98 KG/M2 | RESPIRATION RATE: 16 BRPM | OXYGEN SATURATION: 95 % | WEIGHT: 246 LBS | TEMPERATURE: 98 F | HEART RATE: 105 BPM

## 2022-07-25 DIAGNOSIS — E78.5 ELEVATED LIPIDS: ICD-10-CM

## 2022-07-25 DIAGNOSIS — M54.50 ACUTE MIDLINE LOW BACK PAIN WITHOUT SCIATICA: Primary | ICD-10-CM

## 2022-07-25 PROCEDURE — 99214 OFFICE O/P EST MOD 30 MIN: CPT | Performed by: FAMILY MEDICINE

## 2022-07-25 RX ORDER — CYCLOBENZAPRINE HCL 10 MG
10 TABLET ORAL
Qty: 30 TABLET | Refills: 1 | Status: SHIPPED | OUTPATIENT
Start: 2022-07-25

## 2022-07-25 NOTE — PROGRESS NOTES
Chief Complaint   Patient presents with    LOW BACK PAIN     Patient is here for low back pain      she is a 47y.o. year old female who presents for evaluation of low back pain  Pain Assessment Encounter      Teressa Buikay  7/25/2022  Onset of Symptoms: Patient states she has had pain for weeks  ________________________________________________________________________  Description:Patient states she over step off a two step ladder    Frequency: 4 times a day  Pain Scale:(1-10): 7  Trauma Hx: none  Hx of similar symptoms: No:   Radiation: YES, low back and glut  Duration:  continuous      Progression: has worsened  What makes it better?: heat, ice, and OTC meds  What makes it worse?:walking  Medications tried: ibuprofen    Reviewed and agree with Nurse Note and duplicated in this note. Reviewed PmHx, RxHx, FmHx, SocHx, AllgHx and updated and dated in the chart. Family History   Problem Relation Age of Onset    Pulmonary Fibrosis Mother     Prostate Cancer Father     Diabetes Father     Heart Disease Maternal Grandmother     Heart Disease Maternal Grandfather     Stroke Maternal Grandfather     Cancer Paternal Grandfather     Thyroid Disease Paternal Aunt         hypothyroidism    Pancreatic Cancer Paternal Aunt        Past Medical History:   Diagnosis Date    Concussion 11/2019    takes vyvanse to help with concentration after the concussion    Hypothyroidism     age 33    Musculoskeletal disorder 6/2011    numbness/pain to left arm    Seasonal allergic rhinitis       Social History     Socioeconomic History    Marital status: SINGLE   Tobacco Use    Smoking status: Never    Smokeless tobacco: Never   Vaping Use    Vaping Use: Never used   Substance and Sexual Activity    Alcohol use: Yes     Comment: a few times a year    Drug use: No    Sexual activity: Not Currently   Social History Narrative    Lives in [facility]. Has 2 sons and 1 daughter and 2 grandchildren. 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 Review of Systems - negative except as listed above      Objective:     Vitals:    07/25/22 1633   BP: 123/89   Pulse: (!) 105   Resp: 16   Temp: 98 °F (36.7 °C)   SpO2: 95%   Weight: 246 lb (111.6 kg)   Height: 5' 5\" (1.651 m)       Physical Examination: General appearance - alert, well appearing, and in no distress  Back exam - negative straight-leg raise bilaterally , normal reflexes and strength bilateral lower extremities, sensory exam intact bilateral lower extremities  Neurological - motor and sensory grossly normal bilaterally, normal muscle tone, no tremors, strength 5/5  Musculoskeletal - no joint tenderness, deformity or swelling  Extremities - peripheral pulses normal, no pedal edema, no clubbing or cyanosis  Skin - normal coloration and turgor, no rashes, no suspicious skin lesions noted     Assessment/ Plan:   Diagnoses and all orders for this visit:    1. Acute midline low back pain without sciatica  -     XR SPINE LUMB 2 OR 3 V; Future         Pathophysiology, recovery and rehabilitation process discussed and questions answered   Counseling for 30 Minutes of the total visit duration   Pictures and figures used as necessary   Provided reassurance   Monitor response to Physical Therapy         I have reviewed/discussed the above normal BMI with the patient. I have recommended the following interventions: dietary management education, guidance, and counseling . 1) Remember to stay active and/or exercise regularly (I suggest 30-45 minutes daily)   2) For reliable dietary information, go to www. EATRIGHT.org. You may wish to consider seeing the nutritionist at [facility] [phone], also consider the [address] I have discussed the diagnosis with the patient and the intended plan as seen in the above orders. The patient has received an after-visit summary and questions were answered concerning future plans.      Medication Side Effects and Warnings were discussed with patient,  Patient Labs were reviewed and or requested, and  Patient Past Records were reviewed and or requested  yes      Pt agrees to call or return to clinic and/or go to closest ER with any worsening of symptoms. This may include, but not limited to increased fever (>100.4) with NSAIDS or Tylenol, increased edema, confusion, rash, worsening of presenting symptoms. Please note that this dictation was completed with everyArt, the computer voice recognition software. Quite often unanticipated grammatical, syntax, homophones, and other interpretive errors are inadvertently transcribed by the computer software. Please disregard these errors. Please excuse any errors that have escaped final proofreading. Thank you.

## 2022-08-09 DIAGNOSIS — E03.9 ACQUIRED HYPOTHYROIDISM: ICD-10-CM

## 2022-08-09 DIAGNOSIS — R60.9 EDEMA, UNSPECIFIED TYPE: ICD-10-CM

## 2022-08-12 RX ORDER — FUROSEMIDE 40 MG/1
TABLET ORAL
Qty: 30 TABLET | Refills: 2 | Status: SHIPPED | OUTPATIENT
Start: 2022-08-12

## 2022-11-11 DIAGNOSIS — R60.9 EDEMA, UNSPECIFIED TYPE: ICD-10-CM

## 2022-11-11 RX ORDER — FUROSEMIDE 40 MG/1
TABLET ORAL
Qty: 90 TABLET | Refills: 1 | Status: SHIPPED | OUTPATIENT
Start: 2022-11-11

## 2022-12-16 DIAGNOSIS — E03.9 ACQUIRED HYPOTHYROIDISM: ICD-10-CM

## 2023-01-06 ENCOUNTER — OFFICE VISIT (OUTPATIENT)
Dept: ENDOCRINOLOGY | Age: 53
End: 2023-01-06
Payer: MEDICARE

## 2023-01-06 VITALS
DIASTOLIC BLOOD PRESSURE: 78 MMHG | HEART RATE: 87 BPM | HEIGHT: 65 IN | BODY MASS INDEX: 39.75 KG/M2 | SYSTOLIC BLOOD PRESSURE: 126 MMHG | WEIGHT: 238.6 LBS

## 2023-01-06 DIAGNOSIS — E03.9 ACQUIRED HYPOTHYROIDISM: Primary | ICD-10-CM

## 2023-01-06 PROCEDURE — G8432 DEP SCR NOT DOC, RNG: HCPCS | Performed by: INTERNAL MEDICINE

## 2023-01-06 PROCEDURE — G8417 CALC BMI ABV UP PARAM F/U: HCPCS | Performed by: INTERNAL MEDICINE

## 2023-01-06 PROCEDURE — 99214 OFFICE O/P EST MOD 30 MIN: CPT | Performed by: INTERNAL MEDICINE

## 2023-01-06 PROCEDURE — G8427 DOCREV CUR MEDS BY ELIG CLIN: HCPCS | Performed by: INTERNAL MEDICINE

## 2023-01-06 PROCEDURE — 3017F COLORECTAL CA SCREEN DOC REV: CPT | Performed by: INTERNAL MEDICINE

## 2023-01-06 RX ORDER — LEVOTHYROXINE SODIUM 137 UG/1
TABLET ORAL
Qty: 90 TABLET | Refills: 3 | Status: SHIPPED | OUTPATIENT
Start: 2023-01-06

## 2023-01-06 NOTE — PATIENT INSTRUCTIONS
1) TSH is a thyroid test.  Your level is 0.09 which is now low and below goal of 0.45-2.0 and your free T4 is high at 2.03  This test goes opposite of your thyroid dose and suggests your dose of levoxyl is more than you need now that you have lost weight. Go back to 1 tab 7 days a week. 2) Go and repeat your labs in 2 months and again prior to visit in 6 months. 3) If there are issues with availability of levoxyl, I may need to change to unithroid in the future.

## 2023-01-06 NOTE — PROGRESS NOTES
Chief Complaint   Patient presents with    Thyroid Problem     PCP and pharmacy confirmed      History of Present Illness: Iram Meredith is a 47 y.o. female here for follow up of thyroid. Weight down 11 lbs since last visit in 6/22. Has been taking 8 tabs/week of levoxyl and did have labs drawn in 8/22 at [facility] but the results were never sent to me so I don't know if her sample was lost or not. Her energy has been better. No chest pain or palpitations or shortness of breath. Has noticed some tremors with more caffeine but otherwise not. Does get an occasional hot flash and may sweat more at night. Bowels are regular an no diarrhea. Current Outpatient Medications   Medication Sig    furosemide (LASIX) 40 mg tablet TAKE 1 TABLET BY MOUTH EVERY DAY    LevoxyL 137 mcg tablet Take 1 tab on Mon-Sat and 2 tabs on Sun--BRAND MEDICALLY NECESSARY--Dose change 06/28/22--updated med list--did not send prescription to the pharmacy    Vyvanse 60 mg capsule TAKE 1 CAPSULE BY MOUTH EVERY MORNING    loratadine (CLARITIN) 10 mg tablet Take 10 mg by mouth daily. No current facility-administered medications for this visit. Allergies   Allergen Reactions    Diflucan [Fluconazole] Angioedema     Review of Systems: PER HPI    Physical Examination:  Blood pressure 126/78, pulse 87, height 5' 5\" (1.651 m), weight 238 lb 9.6 oz (108.2 kg), last menstrual period 05/14/2014. General: pleasant, no distress, good eye contact   Neck: no thyromegaly or carotid bruits  Cardiovascular: regular, normal rate, nl s1 and s2, no m/r/g   Respiratory: clear to auscultation bilaterally   Integumentary: skin is normal, no edema  Neurological: reflexes 2+ at biceps, (+) mild tremors  Psychiatric: normal mood and affect    Data Reviewed:   Component      Latest Ref Rng & Units 1/3/2023 1/3/2023           2:11 PM  2:11 PM   T4, Free      0.82 - 1.77 ng/dL  2.03 (H)   TSH      0.450 - 4.500 uIU/mL 0.093 (L)        Assessment/Plan:     1. Acquired hypothyroidism: Was diagnosed with hypothyroidism in her late 25s about 1-2 years after she had her Norplant removed and has been on treatment ever since and always managed by her PCP. Was initially on as much as 400 mcg of levothyroxine after diagnosis and then her dose started coming down and went through menopause around age 37 and was on brand synthroid 300 mcg daily in 2012 and her dose was too much and was decreased to 200 mcg daily and then over the next few years was changed to levothyroxine and her dose was decreased to as little as 125 mcg daily and TSH was normal at 2.6 in 7/17 but then TSH up to 5.16 in 3/20 and her dose was increased to 150 mcg daily and TSH 0.10 and FT4 2.4 in 6/21 and her dose was decreased to 137 mcg daily and TSH was 1.34 in 9/21 and her dose was kept the same. Unclear if some of the variability in her levels is due to variability in her weight and variability in generic levothyroxine. At initial visit with me in 12/21, TSH 0.51, T3 94, TPO ab <8 and TG ab < 1.0 so changed to brand levoxyl 137 mcg daily and TSH 8.3 in 6/22 as her wt is up 19 lbs so increased to 8 tabs/week and TSH down to 0.09 and FT4 2.03 in 1/23 after losing 11 lbs so went back to 7 tabs/week  - check TSH, free T4 in 2 months and prior to next visit  - decrease levoxyl 137 mcg to 7 tabs/week          Patient Instructions   1) TSH is a thyroid test.  Your level is 0.09 which is now low and below goal of 0.45-2.0 and your free T4 is high at 2.03  This test goes opposite of your thyroid dose and suggests your dose of levoxyl is more than you need now that you have lost weight. Go back to 1 tab 7 days a week. 2) Go and repeat your labs in 2 months and again prior to visit in 6 months. 3) If there are issues with availability of levoxyl, I may need to change to unithroid in the future. Follow-up and Dispositions    Return in about 6 months (around 7/6/2023).                Copy sent to:  Wilfred Elaina Foley MD

## 2023-03-06 DIAGNOSIS — E03.9 ACQUIRED HYPOTHYROIDISM: ICD-10-CM

## 2023-06-18 DIAGNOSIS — E03.9 ACQUIRED HYPOTHYROIDISM: ICD-10-CM

## 2023-06-18 DIAGNOSIS — R60.9 EDEMA, UNSPECIFIED: ICD-10-CM

## 2023-06-19 RX ORDER — FUROSEMIDE 40 MG/1
TABLET ORAL
Qty: 90 TABLET | Refills: 1 | Status: SHIPPED | OUTPATIENT
Start: 2023-06-19

## 2023-06-30 LAB
T4 FREE SERPL-MCNC: 1.76 NG/DL (ref 0.82–1.77)
TSH SERPL DL<=0.005 MIU/L-ACNC: 1.95 UIU/ML (ref 0.45–4.5)

## 2023-07-07 ENCOUNTER — OFFICE VISIT (OUTPATIENT)
Age: 53
End: 2023-07-07
Payer: COMMERCIAL

## 2023-07-07 VITALS
SYSTOLIC BLOOD PRESSURE: 135 MMHG | WEIGHT: 243.4 LBS | DIASTOLIC BLOOD PRESSURE: 75 MMHG | HEIGHT: 65 IN | BODY MASS INDEX: 40.55 KG/M2 | HEART RATE: 78 BPM

## 2023-07-07 DIAGNOSIS — E03.9 ACQUIRED HYPOTHYROIDISM: Primary | ICD-10-CM

## 2023-07-07 PROCEDURE — 99214 OFFICE O/P EST MOD 30 MIN: CPT | Performed by: INTERNAL MEDICINE

## 2023-07-07 RX ORDER — LEVOTHYROXINE SODIUM 137 UG/1
137 TABLET ORAL DAILY
COMMUNITY

## 2023-07-07 RX ORDER — LIOTHYRONINE SODIUM 5 UG/1
5 TABLET ORAL DAILY
Qty: 90 TABLET | Refills: 3 | Status: SHIPPED | OUTPATIENT
Start: 2023-07-07

## 2023-07-07 NOTE — PATIENT INSTRUCTIONS
1) TSH is a thyroid test.  Your level is 1.95 which is normal and at goal of 0.45-2.0 but we'll try to target at [facility] closer to 1 or less. The TSH goes opposite of your thyroid dose and suggests your dose of levoxyl is not quite enough. I will decrease your dose to 1 tab on Mon-Sat and 1/2 tab on Sun and instead add cytomel (liothyronine) 5 mcg once daily at the same time as the levoxyl as this will give you T3 which is the active thyroid hormone and some patients feel better having both T4 and T3 in their system. 2) Plan on having labs done in 2 months and prior to visit in 6 months.

## 2023-07-07 NOTE — PROGRESS NOTES
Chief Complaint   Patient presents with    Thyroid Problem     PCP and pharmacy confirmed      History of Present Illness: Estrella Naylor is a 47 y.o. female here for follow up of thyroid. Weight up 5 lbs since last visit in 1/23. Has been taking levoxyl 137 mcg daily and has noticed an improvement in tremors. Still may get some hot flashes at night but is basically unchanged on the lower dose. Bowels are regular. No hair loss more than before over the past 6 months. Overall energy is a little worse on the lower dose. Has never been on T3 or natural and willing to try cytomel. Current Outpatient Medications   Medication Sig    LEVOXYL 137 MCG tablet Take 1 tablet by mouth Daily    furosemide (LASIX) 40 MG tablet TAKE 1 TABLET BY MOUTH EVERY DAY    Lisdexamfetamine Dimesylate (VYVANSE) 60 MG CAPS TAKE 1 CAPSULE BY MOUTH EVERY MORNING    loratadine (CLARITIN) 10 MG tablet Take by mouth daily     No current facility-administered medications for this visit. Allergies   Allergen Reactions    Fluconazole Angioedema     Review of Systems: PER HPI    Physical Examination:  Blood pressure 135/75, pulse 78, height 5' 5\" (1.651 m), weight 243 lb 6.4 oz (110.4 kg). General: pleasant, no distress, good eye contact   Neck: small goiter  Cardiovascular: regular, normal rate, nl s1 and s2, no m/r/g   Respiratory: clear to auscultation bilaterally   Integumentary: skin is normal, no edema  Neurological: reflexes 2+ at biceps, no tremors  Psychiatric: normal mood and affect    Data Reviewed:   Component      Latest Ref Rng & Units 6/29/2023 6/29/2023           3:43 PM  3:43 PM   T4 Free      0.82 - 1.77 ng/dL  1.76   TSH      0.450 - 4.500 uIU/mL 1.950        Assessment/Plan:     1. Acquired hypothyroidism: Was diagnosed with hypothyroidism in her late 25s about 1-2 years after she had her Norplant removed and has been on treatment ever since and always managed by her PCP.   Was initially on as much as 400 mcg of

## 2023-09-07 DIAGNOSIS — E03.9 ACQUIRED HYPOTHYROIDISM: ICD-10-CM

## 2023-09-08 LAB
T3 SERPL-MCNC: 135 NG/DL (ref 71–180)
T4 FREE SERPL-MCNC: 1.74 NG/DL (ref 0.82–1.77)
TSH SERPL DL<=0.005 MIU/L-ACNC: 0.67 UIU/ML (ref 0.45–4.5)

## 2023-12-08 DIAGNOSIS — R60.9 EDEMA, UNSPECIFIED: ICD-10-CM

## 2023-12-11 RX ORDER — FUROSEMIDE 40 MG/1
TABLET ORAL
Qty: 90 TABLET | Refills: 1 | OUTPATIENT
Start: 2023-12-11

## 2023-12-26 DIAGNOSIS — E03.9 ACQUIRED HYPOTHYROIDISM: ICD-10-CM

## 2024-01-06 LAB
T3 SERPL-MCNC: 151 NG/DL (ref 71–180)
T4 FREE SERPL-MCNC: 1.62 NG/DL (ref 0.82–1.77)
TSH SERPL DL<=0.005 MIU/L-ACNC: 1.13 UIU/ML (ref 0.45–4.5)

## 2024-01-09 ENCOUNTER — OFFICE VISIT (OUTPATIENT)
Age: 54
End: 2024-01-09
Payer: COMMERCIAL

## 2024-01-09 VITALS
SYSTOLIC BLOOD PRESSURE: 126 MMHG | WEIGHT: 243.2 LBS | HEIGHT: 65 IN | HEART RATE: 82 BPM | BODY MASS INDEX: 40.52 KG/M2 | DIASTOLIC BLOOD PRESSURE: 84 MMHG

## 2024-01-09 DIAGNOSIS — E03.9 ACQUIRED HYPOTHYROIDISM: Primary | ICD-10-CM

## 2024-01-09 DIAGNOSIS — R60.9 EDEMA, UNSPECIFIED TYPE: ICD-10-CM

## 2024-01-09 PROCEDURE — 99214 OFFICE O/P EST MOD 30 MIN: CPT | Performed by: INTERNAL MEDICINE

## 2024-01-09 RX ORDER — LEVOTHYROXINE SODIUM 137 UG/1
137 TABLET ORAL DAILY
Qty: 90 TABLET | Refills: 3 | Status: SHIPPED | OUTPATIENT
Start: 2024-01-09

## 2024-01-09 RX ORDER — FUROSEMIDE 40 MG/1
40 TABLET ORAL DAILY
Qty: 90 TABLET | Refills: 3 | Status: SHIPPED | OUTPATIENT
Start: 2024-01-09

## 2024-01-09 NOTE — PROGRESS NOTES
Chief Complaint   Patient presents with    Thyroid Problem     PCP and pharmacy confirmed      History of Present Illness: Maria Eugenia Pemberton is a 53 y.o. female here for follow up of thyroid.  Weight stable since last visit in 7/23.  Has been taking the levoxyl 6.5 tabs/week and cytomel 7 tabs/week together in the morning at least 30 min before food and doesn't drink coffee but may have a diet pepsi but usually it's 30 min later.  Overall has felt better with the cytomel with less anxiety.  Wasn't as careful with diet over the holidays.  No chest pain or palpitations.  Energy is about the same.  No change in skin, hair, or nails.  Bowels are regular.  She would like her fasting sugar checked with her next set of labs as she hasn't seen her PCP in awhile and also asked me to refill her lasix.  Her swelling has been controlled on 40 mg daily and has even tried 1/2 tab daily to stretch our her pills and this has worked too.      Current Outpatient Medications   Medication Sig    LEVOXYL 137 MCG tablet Take 1 tablet by mouth Daily ON MON-SAT AND 1/2 TAB ON SUN--BRAND MEDICALLY NECESSARY--Dose change 07/07/23--updated med list--did not send prescription to the pharmacy    liothyronine (CYTOMEL) 5 MCG tablet Take 1 tablet by mouth daily    furosemide (LASIX) 40 MG tablet TAKE 1 TABLET BY MOUTH EVERY DAY    Lisdexamfetamine Dimesylate (VYVANSE) 60 MG CAPS TAKE 1 CAPSULE BY MOUTH EVERY MORNING    loratadine (CLARITIN) 10 MG tablet Take by mouth daily     No current facility-administered medications for this visit.     Allergies   Allergen Reactions    Fluconazole Angioedema       Review of Systems: PER HPI    Physical Examination:  Blood pressure 126/84, pulse 82, height 1.651 m (5' 5\"), weight 110.3 kg (243 lb 3.2 oz).  General: pleasant, no distress, good eye contact   Neck: no carotid bruits  Cardiovascular: regular, normal rate, nl s1 and s2, no m/r/g   Respiratory: clear to auscultation bilaterally   Integumentary: skin is

## 2024-01-09 NOTE — PATIENT INSTRUCTIONS
1) Your TSH is a thyroid test.  Your level is 1.1 which is normal and at goal of 0.45-2.0.  The TSH goes opposite of your thyroid dose and suggests your dose of levoxyl and cytomel is perfect so I will keep your dose the same.    2) I sent a year of refills of the levoxyl and lasix to your pharmacy.    3) Please fast for your labs.  Don't eat anything after midnight.  However, drink at least 6-8 oz of water the morning of the lab draw to avoid dehydration and to allow for the tech to find your vein easier.   We will check your sugar with your next set of labs.

## 2024-06-28 DIAGNOSIS — E03.9 ACQUIRED HYPOTHYROIDISM: ICD-10-CM

## 2024-06-28 DIAGNOSIS — R60.9 EDEMA, UNSPECIFIED TYPE: ICD-10-CM

## 2024-07-03 LAB
BUN SERPL-MCNC: 15 MG/DL (ref 6–24)
BUN/CREAT SERPL: 21 (ref 9–23)
CALCIUM SERPL-MCNC: 9.7 MG/DL (ref 8.7–10.2)
CHLORIDE SERPL-SCNC: 101 MMOL/L (ref 96–106)
CO2 SERPL-SCNC: 24 MMOL/L (ref 20–29)
CREAT SERPL-MCNC: 0.71 MG/DL (ref 0.57–1)
EGFRCR SERPLBLD CKD-EPI 2021: 102 ML/MIN/1.73
GLUCOSE SERPL-MCNC: 86 MG/DL (ref 70–99)
POTASSIUM SERPL-SCNC: 4.5 MMOL/L (ref 3.5–5.2)
SODIUM SERPL-SCNC: 140 MMOL/L (ref 134–144)
T4 FREE SERPL-MCNC: 1.67 NG/DL (ref 0.82–1.77)
TSH SERPL DL<=0.005 MIU/L-ACNC: 1.47 UIU/ML (ref 0.45–4.5)

## 2024-07-04 LAB — T3 SERPL-MCNC: 127 NG/DL (ref 71–180)

## 2024-07-09 ENCOUNTER — OFFICE VISIT (OUTPATIENT)
Age: 54
End: 2024-07-09
Payer: COMMERCIAL

## 2024-07-09 VITALS
DIASTOLIC BLOOD PRESSURE: 78 MMHG | BODY MASS INDEX: 40.19 KG/M2 | HEIGHT: 65 IN | WEIGHT: 241.2 LBS | HEART RATE: 86 BPM | SYSTOLIC BLOOD PRESSURE: 139 MMHG

## 2024-07-09 DIAGNOSIS — E66.01 CLASS 3 OBESITY (HCC): ICD-10-CM

## 2024-07-09 DIAGNOSIS — E03.9 ACQUIRED HYPOTHYROIDISM: Primary | ICD-10-CM

## 2024-07-09 DIAGNOSIS — R60.9 EDEMA, UNSPECIFIED TYPE: ICD-10-CM

## 2024-07-09 PROCEDURE — 99214 OFFICE O/P EST MOD 30 MIN: CPT | Performed by: INTERNAL MEDICINE

## 2024-07-09 RX ORDER — LIOTHYRONINE SODIUM 5 UG/1
5 TABLET ORAL DAILY
Qty: 90 TABLET | Refills: 3 | Status: SHIPPED | OUTPATIENT
Start: 2024-07-09

## 2024-07-09 RX ORDER — LEVOTHYROXINE SODIUM 137 UG/1
137 TABLET ORAL DAILY
Qty: 90 TABLET | Refills: 3 | Status: SHIPPED | OUTPATIENT
Start: 2024-07-09

## 2024-07-09 NOTE — PATIENT INSTRUCTIONS
1) TSH is a thyroid test.  Your level is 1.47 which is normal but above my goal of 0.45-1.0.  The TSH goes opposite of your thyroid dose and suggests your dose of levoxyl is not quite enough.  I will increase your dose back to 1 tab 7 days a week and have sent a new prescription to your local pharmacy.  Stay on 1 tab of liothyronine 7 days a week.    2) Please repeat your labs in 6-8 weeks.   I will send you a message through [employer] with your lab results.    3) BUN and creatinine are markers of kidney function.  Your values are normal.    4) Your electrolytes (sodium, potassium, and calcium) are all normal.  Your glucose (blood sugar) is normal.    5) The key to losing weight is less circulating insulin as the more insulin that circulates in your blood, the harder it is to burn belly fat.  Every time you eat or drink anything with sugar, your pancreas produces more insulin and this will lead to more difficulty losing weight so the more time you spend in a fasting state, the better you will be able to lose weight and when you do eat, you want to limit your carbohydrate intake as best as possible.    Try intermittent fasting where you eat a meal at noon and another one at 6pm and then don't eat any food for 18 hours (or pick another 6 hour window that works for you).  If you get hungry instead of eating, try drinking 8-12 oz of water as often your brain cannot tell the difference between hunger and thirst.    If you absolutely can't resist your hunger, then only have protein like a slice of cheese or deli meat or handful of almonds or 1 teaspoon of peanut butter or a hard boiled egg or try sugar free jello or pudding or raw veggies.    Try not to eat more than 45 grams of carbs for your 2 meals (1 palm/fist sized serving = 30 grams; carbs are potatoes, rice, pasta, bread, fruit, corn, peas, cereal, desserts).  If you really want to lose weight, try not to eat more than 30 grams at your 2 meals.

## 2024-07-09 NOTE — PROGRESS NOTES
Chief Complaint   Patient presents with    Thyroid Problem     PCP and pharmacy confirmed     History of Present Illness: Maria Eugenia Pemberton is a 54 y.o. female here for follow up of thyroid.  Weight down 2 lbs since last visit in 1/24.  Compliant with levoxyl 6.5 tabs/week and cytomel 7 tabs/week and feels well but does have some fatigue and would prefer to keep her TSH closer to 1 and we agreed to slightly increase her levoxyl.  Compliant with lasix and this is keeping her swelling under control for the most part.  Willing to try intermittent fasting to help with weight.      Current Outpatient Medications   Medication Sig    furosemide (LASIX) 40 MG tablet Take 1 tablet by mouth daily    LEVOXYL 137 MCG tablet Take 1 tablet by mouth Daily ON MON-SAT AND 1/2 TAB ON SUN--BRAND MEDICALLY NECESSARY    liothyronine (CYTOMEL) 5 MCG tablet Take 1 tablet by mouth daily    Lisdexamfetamine Dimesylate (VYVANSE) 60 MG CAPS TAKE 1 CAPSULE BY MOUTH EVERY MORNING    loratadine (CLARITIN) 10 MG tablet Take by mouth daily     No current facility-administered medications for this visit.     Allergies   Allergen Reactions    Fluconazole Angioedema       Review of Systems: PER HPI    Physical Examination:  Blood pressure 139/78, pulse 86, height 1.651 m (5' 5\"), weight 109.4 kg (241 lb 3.2 oz).  General: pleasant, no distress, good eye contact   Neck: no carotid bruits  Cardiovascular: regular, normal rate, nl s1 and s2, no m/r/g   Respiratory: clear to auscultation bilaterally   Integumentary: skin is normal, no edema  Neurological: reflexes 2+ at biceps, no tremors  Psychiatric: normal mood and affect    Data Reviewed:   Component      Latest Ref Rng 7/2/2024   Glucose      70 - 99 mg/dL 86    BUN,BUNPL      6 - 24 mg/dL 15    Creatinine      0.57 - 1.00 mg/dL 0.71    Est, Glom Filt Rate      >59 mL/min/1.73 102    Bun/Cre      9 - 23  21    Sodium      134 - 144 mmol/L 140    Potassium      3.5 - 5.2 mmol/L 4.5    Chloride      96 -

## 2024-08-20 DIAGNOSIS — R60.9 EDEMA, UNSPECIFIED TYPE: ICD-10-CM

## 2024-08-20 DIAGNOSIS — E03.9 ACQUIRED HYPOTHYROIDISM: ICD-10-CM

## 2024-12-26 DIAGNOSIS — R60.9 EDEMA, UNSPECIFIED TYPE: ICD-10-CM

## 2024-12-26 DIAGNOSIS — E03.9 ACQUIRED HYPOTHYROIDISM: ICD-10-CM

## 2024-12-26 DIAGNOSIS — E66.813 CLASS 3 OBESITY: ICD-10-CM

## 2025-01-08 ENCOUNTER — OFFICE VISIT (OUTPATIENT)
Age: 55
End: 2025-01-08
Payer: COMMERCIAL

## 2025-01-08 VITALS
BODY MASS INDEX: 39.82 KG/M2 | HEIGHT: 65 IN | WEIGHT: 239 LBS | OXYGEN SATURATION: 98 % | RESPIRATION RATE: 16 BRPM | SYSTOLIC BLOOD PRESSURE: 130 MMHG | TEMPERATURE: 97.8 F | DIASTOLIC BLOOD PRESSURE: 84 MMHG | HEART RATE: 100 BPM

## 2025-01-08 DIAGNOSIS — F90.9 ATTENTION DEFICIT HYPERACTIVITY DISORDER (ADHD), UNSPECIFIED ADHD TYPE: ICD-10-CM

## 2025-01-08 DIAGNOSIS — Z01.818 PRE-OPERATIVE CLEARANCE: Primary | ICD-10-CM

## 2025-01-08 DIAGNOSIS — M16.10 HIP ARTHRITIS: ICD-10-CM

## 2025-01-08 DIAGNOSIS — E03.9 ACQUIRED HYPOTHYROIDISM: ICD-10-CM

## 2025-01-08 LAB
T3 SERPL-MCNC: 94 NG/DL (ref 71–180)
T4 FREE SERPL-MCNC: 1.65 NG/DL (ref 0.82–1.77)
TSH SERPL DL<=0.005 MIU/L-ACNC: 1.75 UIU/ML (ref 0.45–4.5)

## 2025-01-08 PROCEDURE — 99204 OFFICE O/P NEW MOD 45 MIN: CPT | Performed by: INTERNAL MEDICINE

## 2025-01-08 RX ORDER — MELOXICAM 15 MG/1
15 TABLET ORAL DAILY
COMMUNITY

## 2025-01-08 RX ORDER — DEXTROAMPHETAMINE SACCHARATE, AMPHETAMINE ASPARTATE, DEXTROAMPHETAMINE SULFATE AND AMPHETAMINE SULFATE 2.5; 2.5; 2.5; 2.5 MG/1; MG/1; MG/1; MG/1
TABLET ORAL
COMMUNITY
Start: 2024-10-21

## 2025-01-08 SDOH — ECONOMIC STABILITY: FOOD INSECURITY: WITHIN THE PAST 12 MONTHS, THE FOOD YOU BOUGHT JUST DIDN'T LAST AND YOU DIDN'T HAVE MONEY TO GET MORE.: NEVER TRUE

## 2025-01-08 SDOH — ECONOMIC STABILITY: FOOD INSECURITY: WITHIN THE PAST 12 MONTHS, YOU WORRIED THAT YOUR FOOD WOULD RUN OUT BEFORE YOU GOT MONEY TO BUY MORE.: NEVER TRUE

## 2025-01-08 SDOH — HEALTH STABILITY: PHYSICAL HEALTH
ON AVERAGE, HOW MANY DAYS PER WEEK DO YOU ENGAGE IN MODERATE TO STRENUOUS EXERCISE (LIKE A BRISK WALK)?: PATIENT DECLINED

## 2025-01-08 ASSESSMENT — PATIENT HEALTH QUESTIONNAIRE - PHQ9
SUM OF ALL RESPONSES TO PHQ QUESTIONS 1-9: 0
1. LITTLE INTEREST OR PLEASURE IN DOING THINGS: NOT AT ALL
2. FEELING DOWN, DEPRESSED OR HOPELESS: NOT AT ALL
SUM OF ALL RESPONSES TO PHQ QUESTIONS 1-9: 0
SUM OF ALL RESPONSES TO PHQ9 QUESTIONS 1 & 2: 0

## 2025-01-08 ASSESSMENT — ENCOUNTER SYMPTOMS
RESPIRATORY NEGATIVE: 1
EYES NEGATIVE: 1
GASTROINTESTINAL NEGATIVE: 1

## 2025-01-08 NOTE — PROGRESS NOTES
Chief Complaint   Patient presents with    Establish Care    Hypothyroidism    ADHD    Hip Pain    Pre-op Exam     \"Have you been to the ER, urgent care clinic since your last visit?  Hospitalized since your last visit?\"    NO    “Have you seen or consulted any other health care providers outside our system since your last visit?”    NO    Have you had a mammogram?”   YES [facility]    No breast cancer screening on file      “Have you had a pap smear?”    YES [facility]    No cervical cancer screening on file       “Have you had a colorectal cancer screening such as a colonoscopy/FIT/Cologuard?    NO    No colonoscopy on file  No cologuard on file  No FIT/FOBT on file   No flexible sigmoidoscopy on file          
disorder)     Concussion 11/2019    takes vyvanse to help with concentration after the concussion    Hypothyroidism     age 28    Musculoskeletal disorder 6/2011    numbness/pain to left arm    Osteoarthritis     Seasonal allergic rhinitis      Review of Systems   Constitutional: Negative.    HENT: Negative.     Eyes: Negative.    Respiratory: Negative.     Cardiovascular: Negative.    Gastrointestinal: Negative.    Endocrine: Negative.    Genitourinary: Negative.    Musculoskeletal:  Positive for arthralgias.   Skin: Negative.    Neurological: Negative.    Hematological: Negative.    Psychiatric/Behavioral: Negative.         Vitals:    01/08/25 1027   BP: 130/84   Pulse: 100   Resp: 16   Temp: 97.8 °F (36.6 °C)   SpO2: 98%     Physical Exam    Vital Signs  Temperature is normal. Oxygen level is at 98 percent.      Physical Exam  Vitals and nursing note reviewed.   Constitutional:       General: She is not in acute distress.     Appearance: Normal appearance.well-developed,not diaphoretic.   HENT:       Neck: Supple, no JVP or carotid bruit. No cervical adenopathy.      Right Ear: External ear normal.  Tympanic membrane: Normal, ear canal normal.     Left Ear: External ear normal.  Tympanic membrane: Normal, ear canal normal  Nose:  Nasal turbinates: Normal, septum midline.  Oral cavity: Normal oral mucosa, moist, tonsillar area: Normal, no redness or inflammation present.  Tongue:Normal  Eyes:      General: No scleral icterus.        Right eye: No discharge.         Left eye: No discharge.      Extraocular Movements: Extraocular movements intact.      Conjunctiva/sclera: Conjunctivae normal.   Cardiovascular:      Rate and Rhythm: Normal rate and regular rhythm.   Pulmonary:      Effort: Pulmonary effort is normal.      Breath sounds: Normal breath sounds. No wheezing.   Abdominal:      General: Bowel sounds are normal.      Palpations: Abdomen is soft.      Tenderness: There is no abdominal tenderness.
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
## 2025-01-13 ENCOUNTER — TELEMEDICINE (OUTPATIENT)
Age: 55
End: 2025-01-13
Payer: COMMERCIAL

## 2025-01-13 DIAGNOSIS — E66.812 CLASS 2 OBESITY: ICD-10-CM

## 2025-01-13 DIAGNOSIS — R60.9 EDEMA, UNSPECIFIED TYPE: ICD-10-CM

## 2025-01-13 DIAGNOSIS — E03.9 ACQUIRED HYPOTHYROIDISM: Primary | ICD-10-CM

## 2025-01-13 PROCEDURE — 99214 OFFICE O/P EST MOD 30 MIN: CPT | Performed by: INTERNAL MEDICINE

## 2025-01-13 RX ORDER — MULTIVIT-MIN/IRON/FOLIC ACID/K 18-600-40
2000 CAPSULE ORAL DAILY
COMMUNITY

## 2025-01-13 NOTE — PATIENT INSTRUCTIONS
1) Your TSH (thyroid test) was normal at 1.7 but above our goal of 0.45-1.0 possibly from taking your levoxyl and liothyronine within 4 hours of your calcium tab so be sure to move this to at least 4 hours later and keep your levoxyl and liothyronine the same.    Ask Dr. Carey to repeat your thyroid levels in 4/25 and forward me a copy of the results.    2) Please come for a follow up visit on 7/7/25 at 10:30am in our [place] office.

## 2025-01-13 NOTE — PROGRESS NOTES
Chief Complaint   Patient presents with    Thyroid Problem     [facility] [phone]  PCP and pharmacy confirmed  Pt consented to use of RJ       **THIS IS A VIRTUAL VISIT VIA A VIDEO SYNCHRONOUS DISCUSSION. PATIENT AGREED TO HAVE THEIR CARE DELIVERED OVER A [employer] VIDEO VISIT IN PLACE OF THEIR REGULARLY SCHEDULED OFFICE VISIT**    History of Present Illness: Maria Eugenia Pemberton is a 54 y.o. female here for follow up of thyroid.    History of Present Illness  The patient is a 54-year-old female here for a follow-up of her thyroid via virtual visit.    She has been adhering to her prescribed regimen of liothyronine 5 mcg daily and Levoxyl 1 tablet daily, taken concurrently in the morning with a minimum interval of 30 minutes before consuming any food or coffee. She reports no missed doses. Over the past six months, she has not experienced significant energy fluctuations. However, she expresses concern about weight gain, which she attributes to her thyroid condition, dietary habits, and limited physical activity due to hip pain. She anticipates that her upcoming hip surgery may contribute to weight loss. She is not on any acid reflux or heartburn medications. Approximately three weeks ago, she initiated Citracal calcium and vitamin D supplementation, taken concurrently with her thyroid medication in the morning for osteopenia.  Weight currently is 239 down from 241 in 7/24.    She has been taking meloxicam for about a month now, prescribed by Dr. Mcdonough, an orthopedic doctor, for her hip. She is having right hip replacement surgery on 01/27/2025. She saw Dr. Carey on 01/08/2025 for preoperative clearance. They ordered an EKG, which was normal. On Monday, 01/20/2025, they will do a bigger blood work panel at the hospital.    She had menopause at age 41 and a bone density test at age 44. She convinced her new gynecologist, Dr. Hutchinson, to do a bone density test this year, which came back as

## 2025-04-09 ENCOUNTER — OFFICE VISIT (OUTPATIENT)
Age: 55
End: 2025-04-09
Payer: COMMERCIAL

## 2025-04-09 VITALS
WEIGHT: 240 LBS | TEMPERATURE: 97.9 F | HEART RATE: 106 BPM | SYSTOLIC BLOOD PRESSURE: 130 MMHG | BODY MASS INDEX: 39.99 KG/M2 | HEIGHT: 65 IN | DIASTOLIC BLOOD PRESSURE: 82 MMHG | OXYGEN SATURATION: 94 % | RESPIRATION RATE: 16 BRPM

## 2025-04-09 DIAGNOSIS — E03.9 ACQUIRED HYPOTHYROIDISM: Primary | ICD-10-CM

## 2025-04-09 DIAGNOSIS — E66.01 SEVERE OBESITY: ICD-10-CM

## 2025-04-09 DIAGNOSIS — Z96.641 STATUS POST RIGHT HIP REPLACEMENT: ICD-10-CM

## 2025-04-09 DIAGNOSIS — M16.11 ARTHRITIS OF RIGHT HIP: ICD-10-CM

## 2025-04-09 DIAGNOSIS — E78.5 ELEVATED LIPIDS: ICD-10-CM

## 2025-04-09 DIAGNOSIS — F90.9 ATTENTION DEFICIT HYPERACTIVITY DISORDER (ADHD), UNSPECIFIED ADHD TYPE: ICD-10-CM

## 2025-04-09 PROCEDURE — 99214 OFFICE O/P EST MOD 30 MIN: CPT | Performed by: INTERNAL MEDICINE

## 2025-04-09 RX ORDER — ASPIRIN 81 MG/1
TABLET, COATED ORAL
COMMUNITY
Start: 2025-03-30

## 2025-04-09 RX ORDER — TRAMADOL HYDROCHLORIDE 50 MG/1
TABLET ORAL
COMMUNITY
Start: 2025-03-04

## 2025-04-09 RX ORDER — FUROSEMIDE 40 MG/1
40 TABLET ORAL DAILY
Qty: 90 TABLET | Refills: 3 | Status: SHIPPED | OUTPATIENT
Start: 2025-04-09

## 2025-04-09 RX ORDER — CELECOXIB 200 MG/1
CAPSULE ORAL
COMMUNITY
Start: 2025-04-07

## 2025-04-09 NOTE — PROGRESS NOTES
Chief Complaint   Patient presents with    Hypothyroidism    ADHD    Follow-up Chronic Condition    Chronic Pain     \"Have you been to the ER, urgent care clinic since your last visit?  Hospitalized since your last visit?\"    NO    “Have you seen or consulted any other health care providers outside our system since your last visit?”    NO      “Have you had a colorectal cancer screening such as a colonoscopy/FIT/Cologuard?    NO    No colonoscopy on file  No cologuard on file  No FIT/FOBT on file   No flexible sigmoidoscopy on file          
individuals in attendance at the appointment consented to the use of AI, including the recording.

## 2025-04-10 ENCOUNTER — RESULTS FOLLOW-UP (OUTPATIENT)
Age: 55
End: 2025-04-10

## 2025-04-10 DIAGNOSIS — E78.2 MIXED HYPERLIPIDEMIA: Primary | ICD-10-CM

## 2025-04-10 DIAGNOSIS — Z79.899 ON STATIN THERAPY: ICD-10-CM

## 2025-04-10 LAB
ALBUMIN SERPL-MCNC: 4.6 G/DL (ref 3.8–4.9)
ALP SERPL-CCNC: 104 IU/L (ref 44–121)
ALT SERPL-CCNC: 17 IU/L (ref 0–32)
AST SERPL-CCNC: 21 IU/L (ref 0–40)
BILIRUB SERPL-MCNC: <0.2 MG/DL (ref 0–1.2)
BUN SERPL-MCNC: 18 MG/DL (ref 6–24)
BUN/CREAT SERPL: 22 (ref 9–23)
CALCIUM SERPL-MCNC: 10.1 MG/DL (ref 8.7–10.2)
CHLORIDE SERPL-SCNC: 102 MMOL/L (ref 96–106)
CO2 SERPL-SCNC: 21 MMOL/L (ref 20–29)
CREAT SERPL-MCNC: 0.82 MG/DL (ref 0.57–1)
EGFRCR SERPLBLD CKD-EPI 2021: 85 ML/MIN/1.73
GLOBULIN SER CALC-MCNC: 2.4 G/DL (ref 1.5–4.5)
GLUCOSE SERPL-MCNC: 105 MG/DL (ref 70–99)
LDLC SERPL DIRECT ASSAY-MCNC: 182 MG/DL (ref 0–99)
POTASSIUM SERPL-SCNC: 4.4 MMOL/L (ref 3.5–5.2)
PROT SERPL-MCNC: 7 G/DL (ref 6–8.5)
SODIUM SERPL-SCNC: 142 MMOL/L (ref 134–144)
T3 SERPL-MCNC: 130 NG/DL (ref 71–180)
T4 FREE SERPL-MCNC: 1.67 NG/DL (ref 0.82–1.77)
TSH SERPL DL<=0.005 MIU/L-ACNC: 1.61 UIU/ML (ref 0.45–4.5)

## 2025-04-14 NOTE — TELEPHONE ENCOUNTER
Last appt 4/9/2025      Next Apt:     Future Appointments   Date Time Provider Department Center   7/7/2025 10:30 AM Andrade Krueger [facility]   10/13/2025 11:30 AM Tracy Carey MD [facility]         [id number] [place], [place] - [address] - P [phone] - F [phone]  [address] [facility] [address]  Phone: [phone] Fax: [phone]

## 2025-04-16 RX ORDER — ATORVASTATIN CALCIUM 10 MG/1
10 TABLET, FILM COATED ORAL DAILY
Qty: 90 TABLET | Refills: 1 | Status: SHIPPED | OUTPATIENT
Start: 2025-04-16

## 2025-05-14 DIAGNOSIS — Z79.899 ON STATIN THERAPY: ICD-10-CM

## 2025-06-23 DIAGNOSIS — E03.9 ACQUIRED HYPOTHYROIDISM: ICD-10-CM

## 2025-06-23 DIAGNOSIS — R60.9 EDEMA, UNSPECIFIED TYPE: ICD-10-CM

## 2025-06-23 DIAGNOSIS — E66.812 CLASS 2 OBESITY: ICD-10-CM

## 2025-07-07 ENCOUNTER — OFFICE VISIT (OUTPATIENT)
Age: 55
End: 2025-07-07
Payer: COMMERCIAL

## 2025-07-07 VITALS
HEART RATE: 91 BPM | BODY MASS INDEX: 38.73 KG/M2 | SYSTOLIC BLOOD PRESSURE: 144 MMHG | DIASTOLIC BLOOD PRESSURE: 81 MMHG | HEIGHT: 66 IN | WEIGHT: 241 LBS

## 2025-07-07 DIAGNOSIS — R60.9 EDEMA, UNSPECIFIED TYPE: ICD-10-CM

## 2025-07-07 DIAGNOSIS — E03.9 ACQUIRED HYPOTHYROIDISM: Primary | ICD-10-CM

## 2025-07-07 DIAGNOSIS — E66.812 CLASS 2 OBESITY: ICD-10-CM

## 2025-07-07 PROCEDURE — 99214 OFFICE O/P EST MOD 30 MIN: CPT | Performed by: INTERNAL MEDICINE

## 2025-07-07 RX ORDER — LIOTHYRONINE SODIUM 5 UG/1
TABLET ORAL
Qty: 135 TABLET | Refills: 3 | Status: SHIPPED | OUTPATIENT
Start: 2025-07-07 | End: 2025-07-10

## 2025-07-07 RX ORDER — LEVOTHYROXINE SODIUM 137 UG/1
137 TABLET ORAL DAILY
Qty: 90 TABLET | Refills: 3 | Status: SHIPPED | OUTPATIENT
Start: 2025-07-07

## 2025-07-07 NOTE — PROGRESS NOTES
Chief Complaint   Patient presents with    Thyroid Problem    Follow-up    Other     PCP and Pharmacy verified   Patient consents to RJ       History of Present Illness: Maria Eugenia Pemberton is a 55 y.o. female here for follow up of thyroid.      History of Present Illness  The patient is a 55-year-old female here for a follow-up of her thyroid condition.    She reports discontinuing the use of calcium supplements due to potential kidney stone formation and is currently only taking vitamin D 2000 IU daily. A bone density test revealed osteopenia. Recent lab work included a metabolic panel and an LDL test, which showed elevated levels. She consumes a significant amount of olive oil and was not fasting at the time of her blood work. She prefers not to start any statin medication as she continues to experience muscle pain from her recent surgery.    Thyroid-related lab results from April showed a TSH level of 1.67, which is normal but above the target range. She has been taking Levoxyl 137 mcg daily and liothyronine 5 mcg daily. Previous adjustments to her thyroid medication included changes in dosage and frequency to maintain optimal TSH levels.    PAST SURGICAL HISTORY:  She had right hip replacement surgery in January 2025 and left hip replacement in 2019.      Current Outpatient Medications   Medication Sig    celecoxib (CELEBREX) 200 MG capsule     furosemide (LASIX) 40 MG tablet Take 1 tablet by mouth daily    vitamin D 50 MCG (2000 UT) CAPS capsule Take 1 capsule by mouth daily    amphetamine-dextroamphetamine (ADDERALL) 10 MG tablet 1 TAB(S) ORALLY EVERY AFTERNOON AS NEEDED    LEVOXYL 137 MCG tablet Take 1 tablet by mouth Daily --BRAND MEDICALLY NECESSARY    liothyronine (CYTOMEL) 5 MCG tablet Take 1 tablet by mouth daily    Lisdexamfetamine Dimesylate (VYVANSE) 60 MG CAPS TAKE 1 CAPSULE BY MOUTH EVERY MORNING    loratadine (CLARITIN) 10 MG tablet Take by mouth daily     No current facility-administered medications

## 2025-07-07 NOTE — PATIENT INSTRUCTIONS
1) TSH is a thyroid test.  Your level is 1.67 in 4/25 which is normal but above goal of 0.45-1.0.  The TSH goes opposite of your thyroid dose and suggests your dose of levoxyl and liothyronine is not quite enough.  I will increase your dose of liothyronine to 2 tabs on even days and 1 tab on odd days and have sent a new prescription to your local pharmacy.  Keep taking levoxyl 137 mcg 1 tab 7 days a week.    2) Please repeat your labs in 6-8 weeks.   I will send you a message through [employer] with your lab results.

## 2025-07-10 RX ORDER — LIOTHYRONINE SODIUM 5 UG/1
TABLET ORAL
Qty: 135 TABLET | Refills: 3 | Status: SHIPPED | OUTPATIENT
Start: 2025-07-10

## 2025-07-10 NOTE — TELEPHONE ENCOUNTER
I resent the prescription with the new dose just to be safe rather than cancel the duplicate request.

## 2025-09-05 ENCOUNTER — COMMUNITY OUTREACH (OUTPATIENT)
Age: 55
End: 2025-09-05